# Patient Record
Sex: FEMALE | Race: ASIAN | NOT HISPANIC OR LATINO | URBAN - METROPOLITAN AREA
[De-identification: names, ages, dates, MRNs, and addresses within clinical notes are randomized per-mention and may not be internally consistent; named-entity substitution may affect disease eponyms.]

---

## 2023-11-11 ENCOUNTER — INPATIENT (INPATIENT)
Age: 48
LOS: 3 days | Discharge: HOME | End: 2023-11-15
Attending: STUDENT IN AN ORGANIZED HEALTH CARE EDUCATION/TRAINING PROGRAM | Admitting: STUDENT IN AN ORGANIZED HEALTH CARE EDUCATION/TRAINING PROGRAM
Payer: MEDICAID

## 2023-11-11 VITALS
HEART RATE: 79 BPM | SYSTOLIC BLOOD PRESSURE: 115 MMHG | DIASTOLIC BLOOD PRESSURE: 73 MMHG | RESPIRATION RATE: 18 BRPM | TEMPERATURE: 98 F | OXYGEN SATURATION: 98 % | WEIGHT: 116.07 LBS

## 2023-11-11 DIAGNOSIS — H92.01 OTALGIA, RIGHT EAR: ICD-10-CM

## 2023-11-11 DIAGNOSIS — C50.919 MALIGNANT NEOPLASM OF UNSPECIFIED SITE OF UNSPECIFIED FEMALE BREAST: ICD-10-CM

## 2023-11-11 DIAGNOSIS — E11.9 TYPE 2 DIABETES MELLITUS WITHOUT COMPLICATIONS: ICD-10-CM

## 2023-11-11 DIAGNOSIS — Z29.9 ENCOUNTER FOR PROPHYLACTIC MEASURES, UNSPECIFIED: ICD-10-CM

## 2023-11-11 DIAGNOSIS — Z98.890 OTHER SPECIFIED POSTPROCEDURAL STATES: Chronic | ICD-10-CM

## 2023-11-11 DIAGNOSIS — I82.C11 ACUTE EMBOLISM AND THROMBOSIS OF RIGHT INTERNAL JUGULAR VEIN: ICD-10-CM

## 2023-11-11 DIAGNOSIS — G08 INTRACRANIAL AND INTRASPINAL PHLEBITIS AND THROMBOPHLEBITIS: ICD-10-CM

## 2023-11-11 LAB
ALBUMIN SERPL ELPH-MCNC: 4.3 G/DL — SIGNIFICANT CHANGE UP (ref 3.3–5)
ALBUMIN SERPL ELPH-MCNC: 4.3 G/DL — SIGNIFICANT CHANGE UP (ref 3.3–5)
ALP SERPL-CCNC: 102 U/L — SIGNIFICANT CHANGE UP (ref 40–120)
ALP SERPL-CCNC: 102 U/L — SIGNIFICANT CHANGE UP (ref 40–120)
ALT FLD-CCNC: 19 U/L — SIGNIFICANT CHANGE UP (ref 4–33)
ALT FLD-CCNC: 19 U/L — SIGNIFICANT CHANGE UP (ref 4–33)
ANION GAP SERPL CALC-SCNC: 13 MMOL/L — SIGNIFICANT CHANGE UP (ref 7–14)
ANION GAP SERPL CALC-SCNC: 13 MMOL/L — SIGNIFICANT CHANGE UP (ref 7–14)
APTT BLD: 30.3 SEC — SIGNIFICANT CHANGE UP (ref 24.5–35.6)
APTT BLD: 30.3 SEC — SIGNIFICANT CHANGE UP (ref 24.5–35.6)
AST SERPL-CCNC: 16 U/L — SIGNIFICANT CHANGE UP (ref 4–32)
AST SERPL-CCNC: 16 U/L — SIGNIFICANT CHANGE UP (ref 4–32)
BASE EXCESS BLDV CALC-SCNC: 1.8 MMOL/L — SIGNIFICANT CHANGE UP (ref -2–3)
BASE EXCESS BLDV CALC-SCNC: 1.8 MMOL/L — SIGNIFICANT CHANGE UP (ref -2–3)
BASOPHILS # BLD AUTO: 0.04 K/UL — SIGNIFICANT CHANGE UP (ref 0–0.2)
BASOPHILS # BLD AUTO: 0.04 K/UL — SIGNIFICANT CHANGE UP (ref 0–0.2)
BASOPHILS NFR BLD AUTO: 0.4 % — SIGNIFICANT CHANGE UP (ref 0–2)
BASOPHILS NFR BLD AUTO: 0.4 % — SIGNIFICANT CHANGE UP (ref 0–2)
BILIRUB SERPL-MCNC: <0.2 MG/DL — SIGNIFICANT CHANGE UP (ref 0.2–1.2)
BILIRUB SERPL-MCNC: <0.2 MG/DL — SIGNIFICANT CHANGE UP (ref 0.2–1.2)
BLD GP AB SCN SERPL QL: NEGATIVE — SIGNIFICANT CHANGE UP
BLD GP AB SCN SERPL QL: NEGATIVE — SIGNIFICANT CHANGE UP
BLOOD GAS VENOUS COMPREHENSIVE RESULT: SIGNIFICANT CHANGE UP
BLOOD GAS VENOUS COMPREHENSIVE RESULT: SIGNIFICANT CHANGE UP
BUN SERPL-MCNC: 12 MG/DL — SIGNIFICANT CHANGE UP (ref 7–23)
BUN SERPL-MCNC: 12 MG/DL — SIGNIFICANT CHANGE UP (ref 7–23)
CALCIUM SERPL-MCNC: 9.8 MG/DL — SIGNIFICANT CHANGE UP (ref 8.4–10.5)
CALCIUM SERPL-MCNC: 9.8 MG/DL — SIGNIFICANT CHANGE UP (ref 8.4–10.5)
CHLORIDE BLDV-SCNC: 101 MMOL/L — SIGNIFICANT CHANGE UP (ref 96–108)
CHLORIDE BLDV-SCNC: 101 MMOL/L — SIGNIFICANT CHANGE UP (ref 96–108)
CHLORIDE SERPL-SCNC: 98 MMOL/L — SIGNIFICANT CHANGE UP (ref 98–107)
CHLORIDE SERPL-SCNC: 98 MMOL/L — SIGNIFICANT CHANGE UP (ref 98–107)
CO2 BLDV-SCNC: 28.6 MMOL/L — HIGH (ref 22–26)
CO2 BLDV-SCNC: 28.6 MMOL/L — HIGH (ref 22–26)
CO2 SERPL-SCNC: 25 MMOL/L — SIGNIFICANT CHANGE UP (ref 22–31)
CO2 SERPL-SCNC: 25 MMOL/L — SIGNIFICANT CHANGE UP (ref 22–31)
CREAT SERPL-MCNC: 0.63 MG/DL — SIGNIFICANT CHANGE UP (ref 0.5–1.3)
CREAT SERPL-MCNC: 0.63 MG/DL — SIGNIFICANT CHANGE UP (ref 0.5–1.3)
EGFR: 109 ML/MIN/1.73M2 — SIGNIFICANT CHANGE UP
EGFR: 109 ML/MIN/1.73M2 — SIGNIFICANT CHANGE UP
EOSINOPHIL # BLD AUTO: 0.58 K/UL — HIGH (ref 0–0.5)
EOSINOPHIL # BLD AUTO: 0.58 K/UL — HIGH (ref 0–0.5)
EOSINOPHIL NFR BLD AUTO: 5.9 % — SIGNIFICANT CHANGE UP (ref 0–6)
EOSINOPHIL NFR BLD AUTO: 5.9 % — SIGNIFICANT CHANGE UP (ref 0–6)
GAS PNL BLDV: 136 MMOL/L — SIGNIFICANT CHANGE UP (ref 136–145)
GAS PNL BLDV: 136 MMOL/L — SIGNIFICANT CHANGE UP (ref 136–145)
GLUCOSE BLDV-MCNC: 247 MG/DL — HIGH (ref 70–99)
GLUCOSE BLDV-MCNC: 247 MG/DL — HIGH (ref 70–99)
GLUCOSE SERPL-MCNC: 223 MG/DL — HIGH (ref 70–99)
GLUCOSE SERPL-MCNC: 223 MG/DL — HIGH (ref 70–99)
HCO3 BLDV-SCNC: 27 MMOL/L — SIGNIFICANT CHANGE UP (ref 22–29)
HCO3 BLDV-SCNC: 27 MMOL/L — SIGNIFICANT CHANGE UP (ref 22–29)
HCT VFR BLD CALC: 38.3 % — SIGNIFICANT CHANGE UP (ref 34.5–45)
HCT VFR BLD CALC: 38.3 % — SIGNIFICANT CHANGE UP (ref 34.5–45)
HCT VFR BLDA CALC: 38 % — SIGNIFICANT CHANGE UP (ref 34.5–46.5)
HCT VFR BLDA CALC: 38 % — SIGNIFICANT CHANGE UP (ref 34.5–46.5)
HGB BLD CALC-MCNC: 12.8 G/DL — SIGNIFICANT CHANGE UP (ref 11.7–16.1)
HGB BLD CALC-MCNC: 12.8 G/DL — SIGNIFICANT CHANGE UP (ref 11.7–16.1)
HGB BLD-MCNC: 12.5 G/DL — SIGNIFICANT CHANGE UP (ref 11.5–15.5)
HGB BLD-MCNC: 12.5 G/DL — SIGNIFICANT CHANGE UP (ref 11.5–15.5)
IANC: 5.67 K/UL — SIGNIFICANT CHANGE UP (ref 1.8–7.4)
IANC: 5.67 K/UL — SIGNIFICANT CHANGE UP (ref 1.8–7.4)
IMM GRANULOCYTES NFR BLD AUTO: 0.3 % — SIGNIFICANT CHANGE UP (ref 0–0.9)
IMM GRANULOCYTES NFR BLD AUTO: 0.3 % — SIGNIFICANT CHANGE UP (ref 0–0.9)
INR BLD: 0.95 RATIO — SIGNIFICANT CHANGE UP (ref 0.85–1.18)
INR BLD: 0.95 RATIO — SIGNIFICANT CHANGE UP (ref 0.85–1.18)
LACTATE BLDV-MCNC: 2.5 MMOL/L — HIGH (ref 0.5–2)
LACTATE BLDV-MCNC: 2.5 MMOL/L — HIGH (ref 0.5–2)
LYMPHOCYTES # BLD AUTO: 2.93 K/UL — SIGNIFICANT CHANGE UP (ref 1–3.3)
LYMPHOCYTES # BLD AUTO: 2.93 K/UL — SIGNIFICANT CHANGE UP (ref 1–3.3)
LYMPHOCYTES # BLD AUTO: 30 % — SIGNIFICANT CHANGE UP (ref 13–44)
LYMPHOCYTES # BLD AUTO: 30 % — SIGNIFICANT CHANGE UP (ref 13–44)
MCHC RBC-ENTMCNC: 23.6 PG — LOW (ref 27–34)
MCHC RBC-ENTMCNC: 23.6 PG — LOW (ref 27–34)
MCHC RBC-ENTMCNC: 32.6 GM/DL — SIGNIFICANT CHANGE UP (ref 32–36)
MCHC RBC-ENTMCNC: 32.6 GM/DL — SIGNIFICANT CHANGE UP (ref 32–36)
MCV RBC AUTO: 72.3 FL — LOW (ref 80–100)
MCV RBC AUTO: 72.3 FL — LOW (ref 80–100)
MONOCYTES # BLD AUTO: 0.51 K/UL — SIGNIFICANT CHANGE UP (ref 0–0.9)
MONOCYTES # BLD AUTO: 0.51 K/UL — SIGNIFICANT CHANGE UP (ref 0–0.9)
MONOCYTES NFR BLD AUTO: 5.2 % — SIGNIFICANT CHANGE UP (ref 2–14)
MONOCYTES NFR BLD AUTO: 5.2 % — SIGNIFICANT CHANGE UP (ref 2–14)
NEUTROPHILS # BLD AUTO: 5.67 K/UL — SIGNIFICANT CHANGE UP (ref 1.8–7.4)
NEUTROPHILS # BLD AUTO: 5.67 K/UL — SIGNIFICANT CHANGE UP (ref 1.8–7.4)
NEUTROPHILS NFR BLD AUTO: 58.2 % — SIGNIFICANT CHANGE UP (ref 43–77)
NEUTROPHILS NFR BLD AUTO: 58.2 % — SIGNIFICANT CHANGE UP (ref 43–77)
NRBC # BLD: 0 /100 WBCS — SIGNIFICANT CHANGE UP (ref 0–0)
NRBC # BLD: 0 /100 WBCS — SIGNIFICANT CHANGE UP (ref 0–0)
NRBC # FLD: 0 K/UL — SIGNIFICANT CHANGE UP (ref 0–0)
NRBC # FLD: 0 K/UL — SIGNIFICANT CHANGE UP (ref 0–0)
PCO2 BLDV: 45 MMHG — SIGNIFICANT CHANGE UP (ref 39–52)
PCO2 BLDV: 45 MMHG — SIGNIFICANT CHANGE UP (ref 39–52)
PH BLDV: 7.39 — SIGNIFICANT CHANGE UP (ref 7.32–7.43)
PH BLDV: 7.39 — SIGNIFICANT CHANGE UP (ref 7.32–7.43)
PLATELET # BLD AUTO: 202 K/UL — SIGNIFICANT CHANGE UP (ref 150–400)
PLATELET # BLD AUTO: 202 K/UL — SIGNIFICANT CHANGE UP (ref 150–400)
PO2 BLDV: 38 MMHG — SIGNIFICANT CHANGE UP (ref 25–45)
PO2 BLDV: 38 MMHG — SIGNIFICANT CHANGE UP (ref 25–45)
POTASSIUM BLDV-SCNC: 4.6 MMOL/L — SIGNIFICANT CHANGE UP (ref 3.5–5.1)
POTASSIUM BLDV-SCNC: 4.6 MMOL/L — SIGNIFICANT CHANGE UP (ref 3.5–5.1)
POTASSIUM SERPL-MCNC: 4.3 MMOL/L — SIGNIFICANT CHANGE UP (ref 3.5–5.3)
POTASSIUM SERPL-MCNC: 4.3 MMOL/L — SIGNIFICANT CHANGE UP (ref 3.5–5.3)
POTASSIUM SERPL-SCNC: 4.3 MMOL/L — SIGNIFICANT CHANGE UP (ref 3.5–5.3)
POTASSIUM SERPL-SCNC: 4.3 MMOL/L — SIGNIFICANT CHANGE UP (ref 3.5–5.3)
PROT SERPL-MCNC: 7.4 G/DL — SIGNIFICANT CHANGE UP (ref 6–8.3)
PROT SERPL-MCNC: 7.4 G/DL — SIGNIFICANT CHANGE UP (ref 6–8.3)
PROTHROM AB SERPL-ACNC: 10.8 SEC — SIGNIFICANT CHANGE UP (ref 9.5–13)
PROTHROM AB SERPL-ACNC: 10.8 SEC — SIGNIFICANT CHANGE UP (ref 9.5–13)
RBC # BLD: 5.3 M/UL — HIGH (ref 3.8–5.2)
RBC # BLD: 5.3 M/UL — HIGH (ref 3.8–5.2)
RBC # FLD: 15 % — HIGH (ref 10.3–14.5)
RBC # FLD: 15 % — HIGH (ref 10.3–14.5)
RH IG SCN BLD-IMP: POSITIVE — SIGNIFICANT CHANGE UP
RH IG SCN BLD-IMP: POSITIVE — SIGNIFICANT CHANGE UP
SAO2 % BLDV: 46 % — LOW (ref 67–88)
SAO2 % BLDV: 46 % — LOW (ref 67–88)
SODIUM SERPL-SCNC: 136 MMOL/L — SIGNIFICANT CHANGE UP (ref 135–145)
SODIUM SERPL-SCNC: 136 MMOL/L — SIGNIFICANT CHANGE UP (ref 135–145)
WBC # BLD: 9.76 K/UL — SIGNIFICANT CHANGE UP (ref 3.8–10.5)
WBC # BLD: 9.76 K/UL — SIGNIFICANT CHANGE UP (ref 3.8–10.5)
WBC # FLD AUTO: 9.76 K/UL — SIGNIFICANT CHANGE UP (ref 3.8–10.5)
WBC # FLD AUTO: 9.76 K/UL — SIGNIFICANT CHANGE UP (ref 3.8–10.5)

## 2023-11-11 PROCEDURE — 99285 EMERGENCY DEPT VISIT HI MDM: CPT

## 2023-11-11 PROCEDURE — 70481 CT ORBIT/EAR/FOSSA W/DYE: CPT | Mod: 26,MG

## 2023-11-11 PROCEDURE — 70496 CT ANGIOGRAPHY HEAD: CPT | Mod: 26,MG

## 2023-11-11 PROCEDURE — G1004: CPT

## 2023-11-11 PROCEDURE — 99223 1ST HOSP IP/OBS HIGH 75: CPT | Mod: GC

## 2023-11-11 RX ORDER — HEPARIN SODIUM 5000 [USP'U]/ML
4000 INJECTION INTRAVENOUS; SUBCUTANEOUS EVERY 6 HOURS
Refills: 0 | Status: DISCONTINUED | OUTPATIENT
Start: 2023-11-11 | End: 2023-11-15

## 2023-11-11 RX ORDER — SODIUM CHLORIDE 9 MG/ML
1000 INJECTION INTRAMUSCULAR; INTRAVENOUS; SUBCUTANEOUS ONCE
Refills: 0 | Status: COMPLETED | OUTPATIENT
Start: 2023-11-11 | End: 2023-11-11

## 2023-11-11 RX ORDER — HEPARIN SODIUM 5000 [USP'U]/ML
4000 INJECTION INTRAVENOUS; SUBCUTANEOUS ONCE
Refills: 0 | Status: COMPLETED | OUTPATIENT
Start: 2023-11-11 | End: 2023-11-11

## 2023-11-11 RX ORDER — KETOROLAC TROMETHAMINE 30 MG/ML
30 SYRINGE (ML) INJECTION ONCE
Refills: 0 | Status: DISCONTINUED | OUTPATIENT
Start: 2023-11-11 | End: 2023-11-11

## 2023-11-11 RX ORDER — HEPARIN SODIUM 5000 [USP'U]/ML
2000 INJECTION INTRAVENOUS; SUBCUTANEOUS EVERY 6 HOURS
Refills: 0 | Status: DISCONTINUED | OUTPATIENT
Start: 2023-11-11 | End: 2023-11-15

## 2023-11-11 RX ORDER — HEPARIN SODIUM 5000 [USP'U]/ML
INJECTION INTRAVENOUS; SUBCUTANEOUS
Qty: 25000 | Refills: 0 | Status: DISCONTINUED | OUTPATIENT
Start: 2023-11-11 | End: 2023-11-15

## 2023-11-11 RX ORDER — METOCLOPRAMIDE HCL 10 MG
10 TABLET ORAL ONCE
Refills: 0 | Status: COMPLETED | OUTPATIENT
Start: 2023-11-11 | End: 2023-11-11

## 2023-11-11 RX ADMIN — HEPARIN SODIUM 4000 UNIT(S): 5000 INJECTION INTRAVENOUS; SUBCUTANEOUS at 19:45

## 2023-11-11 RX ADMIN — HEPARIN SODIUM 1000 UNIT(S)/HR: 5000 INJECTION INTRAVENOUS; SUBCUTANEOUS at 19:45

## 2023-11-11 RX ADMIN — SODIUM CHLORIDE 1000 MILLILITER(S): 9 INJECTION INTRAMUSCULAR; INTRAVENOUS; SUBCUTANEOUS at 16:24

## 2023-11-11 RX ADMIN — Medication 30 MILLIGRAM(S): at 16:17

## 2023-11-11 RX ADMIN — Medication 10 MILLIGRAM(S): at 16:24

## 2023-11-11 NOTE — ED ADULT NURSE REASSESSMENT NOTE - NS ED NURSE REASSESS COMMENT FT1
Patient brought to Results Waiting area from [facility]. Patient seen by Neuro and had labs drawn and sent as ordered. Pt has a 20 gauge IV lock to left antecubital. report given to Intake KATIA Lott. Darnell transferred over for Heparin administration.   KATIA Zhou

## 2023-11-11 NOTE — H&P ADULT - PROBLEM SELECTOR PLAN 4
DVT ppx: heparin gtt  Diet: Consistent carb  Dispo: pending course and clinical improvement  Code status: full code Pt with history of Type 2 DM on home metformin 500 mg BID    Plan:  Hold home metformin while inpatient  Obtain HbA1c with AM labs  Monitor FS AC and QHS  Low dose ISS Pt with history of R breast cancer dx in 2017 (s/p lumpectomy and radiation), follows at [facility]. Reports has been in remission on mammogram outpatient in June 2023.    Plan:  Hold anastrazole 1 mg while inpatient  CT chest to evaluate for recurrence/progression of malignancy  CTA to evaluate for PE  continue heme/onc f/u Pt with history of R breast cancer dx in 2017 (s/p lumpectomy and radiation completed in 2023, currently on anastrozole 1 mg daily), follows at [facility]. Reports has been in remission on mammogram outpatient in June 2023.    Plan:  Hold anastrozole 1 mg while inpatient  CT chest to evaluate for recurrence/progression of malignancy  CTA to evaluate for PE  continue heme/onc f/u Pt with history of R breast cancer dx in 2017 (s/p lumpectomy and radiation completed in 2023, currently on anastrozole 1 mg daily), follows at [facility]. Reports has been in remission on mammogram outpatient in June 2023.    Plan:  Hold anastrozole 1 mg while inpatient  CTA to evaluate for recurrence/progression of malignancy and to evaluate for PE  continue heme/onc f/u Pt with history of R breast cancer dx in 2017 (s/p lumpectomy and radiation completed in 2023, currently on anastrozole 1 mg daily), follows at [facility]. Reports has been in remission on mammogram outpatient in June 2023.    Plan:  Hold anastrozole 1 mg while inpatient  CTA to evaluate for recurrence/progression of malignancy and to evaluate for PE  Consider heme onc consult Pt with history of R breast cancer dx in 2017 (s/p lumpectomy and radiation completed in 2023, currently on anastrozole 1 mg daily), follows at [facility]. Reports has been in remission since mammogram outpatient in June 2023.    Plan:  Hold anastrozole 1 mg daily for now  CTA chest with IV contrast to evaluate for recurrence/progression of malignancy and to evaluate for PE  Reach out to pt's oncology team at [place] to see if pt can safely c/w anastrozole given thrombus

## 2023-11-11 NOTE — ED PEDIATRIC TRIAGE NOTE - CHIEF COMPLAINT QUOTE
Patient with right ear pain for 3 weeks worsening progressively now extended to the neck and the forehead. Denies trauma, denies discharge. Denies fever. Seen by doctor and started on ear drops 5 ays ago but not working

## 2023-11-11 NOTE — CONSULT NOTE ADULT - ASSESSMENT
ASSESSMENT       IMPRESSION     RECOMMENDATION   [] heparin drip  [] f/u CTV  [] MRI brain w/o contrast, MRV (if CTV not performed)  [] ophthalmology consult to evaluate for papilledema  [] hypercoag workup  [] q4 neurochecks  [] if worsening neurology status, please update neurology team as further intervention (e.g. endovascular) may be indicated, hold off for now    Case discussed with stroke fellow Dr. Leonard Sandoval under superivison of Dr. Arrieta  Patient to be seen by team and attending in AM. Note finalized upon attending attestation.  ASSESSMENT   48-year-old female past medical history of diabetes and breast cancer (diagnosed in 2017 reports completed radiotherapy in 2023), presenting initially to the emergency room complaining of right ear pain >3 weeks. CT venogram showing occlusion of the RIGHT internal jugular bulb with intraluminal thrombus extending into the proximal cervical RIGHT internal jugular vein.  Occlusion of the RIGHT sigmoid sinus and nonocclusive thrombus extending into the distal RIGHT transverse sinus.  LKW: >3 weeks  MRS: 0  NIHSS: 0    IMPRESSION   More than 3 weeks durations with CTV imaging showing right internal IJ with intraluminal thrombus extending into the proximal cervical right internal jugular vein, occlusion of right sigmoid sinus and nonocclusive thrombus extending into the distal right transverse sinus, in setting of breast cancer, may be hypercoag etiology, pending further workup    RECOMMENDATION   [] heparin drip  [] f/u CTV  [] MRI brain w/o contrast, MRV (if CTV not performed)  [] ophthalmology consult to evaluate for papilledema  [] hypercoag workup  [] q4 neurochecks  [] given no focal neurological deficits likely minimal indication for endovascular intervention; if worsening neurology status, please update neurology team and touch base with neurosurgery team    Case discussed with stroke fellow Dr. Leonard Sandoval under supervision of Dr. Arrieta  Patient to be seen by team and attending in AM. Note finalized upon attending attestation.  ASSESSMENT   48-year-old female past medical history of diabetes and breast cancer (diagnosed in 2017 reports completed radiotherapy in 2023), presenting initially to the emergency room complaining of right ear pain >3 weeks. CT venogram showing occlusion of the RIGHT internal jugular bulb with intraluminal thrombus extending into the proximal cervical RIGHT internal jugular vein.  Occlusion of the RIGHT sigmoid sinus and nonocclusive thrombus extending into the distal RIGHT transverse sinus.  LKW: >3 weeks  MRS: 0  NIHSS: 0    IMPRESSION   More than 3 weeks durations with CTV imaging showing right internal IJ with intraluminal thrombus extending into the proximal cervical right internal jugular vein, occlusion of right sigmoid sinus and nonocclusive thrombus extending into the distal right transverse sinus, in setting of breast cancer, may be hypercoag etiology, pending further workup    RECOMMENDATION   [] heparin drip  [] MRI brain w/o contrast  [] ophthalmology consult to evaluate for papilledema  [] hypercoag workup  [] q4 neurochecks  [] given no focal neurological deficits likely minimal indication for endovascular intervention; if worsening neurology status, please update neurology team and touch base with neurosurgery team    Case discussed with stroke fellow Dr. Leonard Sandoval under supervision of Dr. Arrieta  Patient to be seen by team and attending in AM. Note finalized upon attending attestation.  ASSESSMENT   48-year-old female past medical history of diabetes and breast cancer (diagnosed in 2017 reports completed radiotherapy in 2023), presenting initially to the emergency room complaining of right ear pain >3 weeks. CT venogram showing occlusion of the RIGHT internal jugular bulb with intraluminal thrombus extending into the proximal cervical RIGHT internal jugular vein.  Occlusion of the RIGHT sigmoid sinus and nonocclusive thrombus extending into the distal RIGHT transverse sinus.  LKW: >3 weeks  MRS: 0  NIHSS: 0    IMPRESSION   More than 3 weeks durations with CTV imaging showing right internal IJ with intraluminal thrombus extending into the proximal cervical right internal jugular vein, occlusion of right sigmoid sinus and nonocclusive thrombus extending into the distal right transverse sinus, in setting of breast cancer, may be hypercoag etiology, pending further workup    RECOMMENDATION   [] heparin drip  [] MRI brain w/o contrast  [] ophthalmology consult to evaluate for papilledema  [] hypercoag workup  [] q4 neurochecks  [] given no focal neurological deficits likely minimal indication for endovascular intervention; if worsening neurology status, please update neurology team and touch base with neurosurgery team    Paged primary team overnight recommending more urgent ophthalmology consult.  Case discussed with stroke fellow Dr. Leonard Sandoval under supervision of Dr. Arrieta  Patient to be seen by team and attending in AM. Note finalized upon attending attestation.

## 2023-11-11 NOTE — ED PROVIDER NOTE - PHYSICAL EXAMINATION
Gen: Well appearing in NAD  Head: NC/AT  Neck: trachea midline  Resp:  No distress  Ext: no deformities  Neuro:  A&O appears non focal  Skin:  Warm and dry as visualized  Psych:  Normal affect and mood     right ear: no swelling no obv def. no auricular or tragal ttp. + ttp msastoid area. no swelling or induration  mild eac swelling and erythema. tm visualized without erythema or purulence.

## 2023-11-11 NOTE — ED ADULT NURSE NOTE - OBJECTIVE STATEMENT
Pt received c/o R ear pain X 3 weeks, seen at urgent care and given ear drops with no relief. Reports pain radiating to head and down neck. Hx. of breast cancer not on chemo and DM.  Denies Chest pain/SOB. Respirations even and unlabored. 20g IV placed in L AC, labs drawn as ordered. SAfety maintained. Pt received c/o R ear pain X 3 weeks, seen at urgent care and given ear drops with no relief. Reports pain radiating to head and down neck. Hx. of breast cancer not on chemo and DM.  Denies Chest pain/SOB. Respirations even and unlabored. 20g IV placed in L AC, labs drawn as ordered. Safety maintained.

## 2023-11-11 NOTE — H&P ADULT - ASSESSMENT
48-year-old female past medical history of Type 2 DM presents with right sided ear pain x2 weeks. R pinna is very tender to palpation, with associated pain radiating up to the temple and down to her right jaw, no fevers, chills, no ear discharge, bleeding, or auditory changes. CT venogram revealing occlusion of the R internal jugular bulb with intraluminal thrombus extending into the proximal cervical R IJ vein. Occlusion of the R sigmoid sinus and nonocclusive thrombus extending into the distal RIGHT transverse sinus. Admitted to medicine for further management of thrombus 48-year-old female past medical history of Type 2 DM presents with headache and right sided ear pain for the past 3 weeks. R pinna is very tender to palpation, with associated pain radiating up to the temple and down to her right jaw, no fevers, chills, no ear discharge, bleeding, or auditory changes. CT venogram revealing occlusion of the R internal jugular bulb with intraluminal thrombus extending into the proximal cervical R IJ vein, occlusion of the R sigmoid sinus and nonocclusive thrombus extending into the distal RIGHT transverse sinus. Admitted to medicine for further management of thrombus 48-year-old female past medical history of Type 2 DM presents with headache and right sided ear pain for the past 3 weeks. R pinna is very tender to palpation, with associated pain radiating up to the temple and down to her right jaw, no fevers, chills, no ear discharge, bleeding, or auditory changes. CT venogram revealing occlusion of the R internal jugular bulb with intraluminal thrombus extending into the proximal cervical R IJ vein, occlusion of the R sigmoid sinus and nonocclusive thrombus extending into the distal R transverse sinus. Admitted to medicine for further management of thrombus 48-year-old female past medical history of Breast cancer (s/p lumpectomy, radiation tx, and on anastrozole),Type 2 DM presents with headache and right sided ear pain for the past 3 weeks. R pinna is tender to palpation, with associated pain radiating up to the temple and down to her right jaw, no fevers, chills, no ear discharge, bleeding, auditory or neurological changes. CT venogram revealing occlusion of the R internal jugular bulb with intraluminal thrombus extending into the proximal cervical R IJ vein, occlusion of the R sigmoid sinus and nonocclusive thrombus extending into the distal R transverse sinus. Admitted to medicine for further management of thrombus 48-year-old female with a history of R breast cancer (dx 2017 s/p lumpectomy, radiation tx in 2023, in remission on anastrozole),Type 2 DM presents with headache and right sided ear pain for the past 3 weeks. R pinna is tender to palpation, with associated pain radiating up to the temple and down to her right jaw, no fevers, chills, no ear discharge, bleeding, auditory or neurological changes. CT venogram revealing occlusion of the R internal jugular bulb with intraluminal thrombus extending into the proximal cervical R IJ vein, occlusion of the R sigmoid sinus and nonocclusive thrombus extending into the distal R transverse sinus. Admitted to medicine for further management of thrombus

## 2023-11-11 NOTE — H&P ADULT - PROBLEM SELECTOR PLAN 2
CT venogram and IAC showing R internal jugular bulb with intraluminal thrombus extending into the proximal cervical R internal jugular vein. Occlusion of the R sigmoid sinus and nonocclusive thrombus extending into the distal RIGHT transverse sinus. No hx of trauma to the neck, no catheterization, __ on OCPs.    Plan:  Continue on heparin gtt  Hypercoaguability workup: Factor V leiden  f/u brain MRI to rule out venous infarct  Neurosurgery consulted regarding possible thrombectomy Pt with history of breast cancer in 2017 s/p lumpectomy and RT, on Anastrazole 1 mg daily. CT venogram and IAC showing R internal jugular bulb with intraluminal thrombus extending into the proximal cervical R internal jugular vein. Occlusion of the R sigmoid sinus and nonocclusive thrombus extending into the distal RIGHT transverse sinus.  No hx of trauma to the neck, no catheterization of extremities, non smoker, no recent travel. Coag studies wnl. Pt is hypercoagulable due to hx of malignancy and current aromatase inhibitor use.    Plan:  Continue on heparin gtt  Hypercoaguability workup: Inherited thrombophilias (protein S, C, antithrombin deficiency, FVL, prothrombin gene mutation)  US of b/l lower extremities for LE DVT evaluation  f/u brain MRI to rule out venous infarct  Monitor neuro checks q4  Monitor on tele  f/u screening EKG  Neurology recs appreciated, as no neuro deficits likely no role for neurosurgery but will consider neurosurgery for thrombectomy if any changes to neuro exam Pt with history of breast cancer in 2017 s/p lumpectomy and RT, on Anastrazole 1 mg daily. CT venogram and IAC showing R internal jugular bulb with intraluminal thrombus extending into the proximal cervical R internal jugular vein. Occlusion of the R sigmoid sinus and nonocclusive thrombus extending into the distal RIGHT transverse sinus.  No hx of trauma to the neck, no catheterization of extremities, non smoker, no recent travel. Coag studies wnl. Pt is hypercoagulable due to hx of malignancy and current aromatase inhibitor use.    Plan:  Continue on heparin gtt  Hypercoaguability workup: Inherited thrombophilias (protein S, C, antithrombin deficiency, FVL, prothrombin gene mutation)  US of b/l lower extremities for LE DVT evaluation  f/u brain MRI to rule out venous infarct  Monitor neuro checks q4  Monitor on tele  f/u screening EKG  Neurology recs appreciated, as no neuro deficits likely no role for neurosurgery but will consider neurosurgery for thrombectomy if any changes to neuro exam  Ophthalmology consult in AM to evaluate for papilledema Pt with history of breast cancer in 2017 s/p lumpectomy and RT, on Anastrazole 1 mg daily. CT venogram and IAC showing R internal jugular bulb with intraluminal thrombus extending into the proximal cervical R internal jugular vein. Occlusion of the R sigmoid sinus and nonocclusive thrombus extending into the distal RIGHT transverse sinus.  No hx of trauma to the neck, no catheterization of extremities, non smoker, no recent travel. Coag studies wnl. Pt is hypercoagulable due to hx of malignancy and current aromatase inhibitor use.    Plan:  Continue on heparin gtt  Hypercoaguability workup: Inherited thrombophilias (protein S, C, antithrombin deficiency, FVL, prothrombin gene mutation)  US of b/l lower extremities for LE DVT evaluation  f/u brain MRI to rule out venous infarct  Monitor neuro checks q4  Monitor on tele  Neurology recs appreciated, as no neuro deficits likely no role for neurosurgery but placed neurosurgery consult, f/u recs for thrombectomy if any changes to neuro exam  Ophthalmology consult in AM to evaluate for papilledema Pt with history of breast cancer in 2017 s/p lumpectomy and RT, on Anastrazole 1 mg daily. CT venogram and IAC showing R internal jugular bulb with intraluminal thrombus extending into the proximal cervical R internal jugular vein. Occlusion of the R sigmoid sinus and nonocclusive thrombus extending into the distal RIGHT transverse sinus.  No hx of trauma to the neck, no catheterization of extremities, non smoker, no recent travel. Coag studies wnl. Pt is hypercoagulable due to hx of malignancy and current aromatase inhibitor use.    Plan:  Continue on heparin gtt  Hypercoaguability workup: Inherited thrombophilias (protein S, C, functional assay of activated protein C for factor V Leiden, antithrombin deficiency, beta 2 glycoprotein, anticardiolipin, Lupus anticoagulant, antiphospholipid Ab)  US of b/l lower extremities for LE DVT evaluation  f/u brain MRI to rule out venous infarct  Monitor neuro checks q4  Monitor on tele  Neurology recs appreciated, as no neuro deficits likely no role for neurosurgery but placed neurosurgery consult, f/u recs for thrombectomy if any changes to neuro exam  Ophthalmology consult in AM to evaluate for papilledema Pt with history of breast cancer in 2017 s/p lumpectomy and RT, on Anastrazole 1 mg daily. CT venogram and IAC showing R internal jugular bulb with intraluminal thrombus extending into the proximal cervical R internal jugular vein. Occlusion of the R sigmoid sinus and nonocclusive thrombus extending into the distal RIGHT transverse sinus.  No hx of trauma to the neck, no catheterization of extremities, non smoker, no recent travel. Coag studies wnl. Pt is hypercoagulable and likely provoked due to hx of malignancy and current aromatase inhibitor use, but will do hypercoagulability workup.    Plan:  Continue on heparin gtt  f/u Hypercoaguability workup: Inherited thrombophilias (protein S, C, functional assay of activated protein C for factor V Leiden, antithrombin deficiency, beta 2 glycoprotein, anticardiolipin, Lupus anticoagulant)  US of b/l lower extremities for LE DVT evaluation  f/u brain MRI to rule out venous infarct  Monitor neuro checks q4  Monitor on tele  Neurology recs appreciated, as no neuro deficits likely no role for neurosurgery but placed neurosurgery consult, f/u recs for thrombectomy if any changes to neuro exam  Ophthalmology consult in AM to evaluate for papilledema Pt with history of breast cancer in 2017 s/p lumpectomy and RT, on anastrazole 1 mg daily. CT venogram and IAC showing R internal jugular bulb with intraluminal thrombus extending into the proximal cervical R internal jugular vein. Occlusion of the R sigmoid sinus and nonocclusive thrombus extending into the distal RIGHT transverse sinus. No hx of trauma to the neck, no catheterization of extremities, non smoker, no recent travel. Coag studies wnl. Pt is hypercoagulable and likely provoked due to hx of malignancy and current aromatase inhibitor use, but will do hypercoagulability workup.    Plan:  Continue on heparin gtt  F/u hypercoagulability workup: Inherited thrombophilias (protein S, C, functional assay of activated protein C for factor V Leiden, antithrombin deficiency, beta 2 glycoprotein, anticardiolipin, Lupus anticoagulant)  Venous dopplers of b/l lower extremities for LE DVT evaluation  F/u MRI brain to rule out venous infarct  Monitor neuro checks q4hrs  Monitor on tele  Neurology recs appreciated, as no neuro deficits likely no role for neurosurgery but placed neurosurgery consult, f/u recs for thrombectomy if any changes to neuro exam  Ophthalmology consult to be called in AM to evaluate for R papilledema

## 2023-11-11 NOTE — CONSULT NOTE ADULT - SUBJECTIVE AND OBJECTIVE BOX
Neurology - Consult Note    Spectra: [phone] ([facility]), [phone] ([facility])    HPI:  48-year-old female past medical history of Type 2 DM presents with right ear pain x2 weeks.  Patient states that for the past 2 weeks has had right-sided ear pain, the ear is painful posteriorly and is very tender to palpation, radiating up to the temple and down to her right jaw. She went to urgent care last week and was prescribed eardrops with minimal relief.  Patient denies ear discharge or bleeding, auditory changes, fevers, chills, nausea, vomiting,    ED vitals: T 98.3 HR 98 /98 RR 18 Sa 100% on RA  CT internal auditory canal w/ IV contrast and CT venogram brain w/ IV contrast:   In ED, received 1L NS, toradol, reglan. Started on heparin gtt (11 Nov 2023 21:55)      Review of Systems:  INCOMPLETE   CONSTITUTIONAL: No fevers or chills  EYES AND ENT: No visual changes or no throat pain   NECK: No pain or stiffness  RESPIRATORY: No shortness of breath  CARDIOVASCULAR: No chest pain or palpitations  GASTROINTESTINAL: No nausea or vomiting   GENITOURINARY: No dysuria  NEUROLOGICAL: +As stated in HPI above  SKIN: No itching, burning, rashes, or lesions   All other review of systems is negative unless indicated above.    Allergies:  No Known Allergies      PMHx/PSHx/Family Hx: As above, otherwise see below   DM (diabetes mellitus)        Social Hx:  Never smoker; no current use of tobacco, alcohol, or illicit drugs  Lives with ***  Occupation ***  Baseline functional status is ***    Medications:  MEDICATIONS  (STANDING):  heparin  Infusion.  Unit(s)/Hr (10 mL/Hr) IV Continuous <Continuous>    MEDICATIONS  (PRN):  heparin   Injectable 2000 Unit(s) IV Push every 6 hours PRN For aPTT between 40 - 57  heparin   Injectable 4000 Unit(s) IV Push every 6 hours PRN For aPTT less than 40      Vitals:  T(C): 36.6 (11-11-23 @ 17:37), Max: 36.8 (11-11-23 @ 14:45)  HR: 68 (11-11-23 @ 21:50) (67 - 98)  BP: 118/74 (11-11-23 @ 21:50) (115/73 - 158/98)  RR: 18 (11-11-23 @ 21:50) (18 - 18)  SpO2: 99% (11-11-23 @ 21:50) (98% - 100%)    Physical Examination: INCOMPLETE  General - non-toxic appearing male/female in no acute distress  Cardiovascular - peripheral pulses palpable, no edema  Respiratory - breathing comfortably with no increased work of breathing    Neurologic Exam:  Mental status - Awake, Alert, Oriented to person, place, and time. Speech fluent, repetition and naming intact. Follows simple and complex commands. Attention/concentration, recent and remote memory (including registration 3/3 and recall 3/3), and fund of knowledge intact    Cranial nerves - PERRLA (4mm -> 3mm b/l), VFF, EOMI, face sensation (V1-V3) intact b/l, facial strength intact without asymmetry b/l, hearing intact b/l, palate with symmetric elevation, trapezius OR sternocleidomastiod 5/5 strength b/l, tongue midline on protrusion with full lateral movement    Motor - Normal bulk and tone throughout. No pronator drift.  Strength testing            Deltoid      Biceps      Triceps     Wrist Extension    Wrist Flexion     Interossei         R            5                 5               5                     5                              5                        5                 5  L             5                 5               5                     5                              5                        5                 5              Hip Flexion    Hip Extension    Knee Flexion    Knee Extension    Dorsiflexion    Plantar Flexion  R              5                         5                       5                           5                            5                          5  L              5                         5                        5                           5                            5                          5    Sensation - Light touch/temperature OR pain/vibration intact throughout  DTR's -             Biceps      Triceps     Brachioradialis      Patellar    Ankle    Toes/plantar response  R             2+             2+                  2+                       2+            2+                 Down  L              2+             2+                 2+                        2+           2+                 Down    Coordination - Finger to Nose intact b/l. No tremors appreciated    Gait and station - Normal casual gait. Romberg (-)    Labs:                        12.5   9.76  )-----------( 202      ( 11 Nov 2023 15:32 )             38.3     11-11    136  |  98  |  12  ----------------------------<  223<H>  4.3   |  25  |  0.63    Ca    9.8      11 Nov 2023 15:32    TPro  7.4  /  Alb  4.3  /  TBili  <0.2  /  DBili  x   /  AST  16  /  ALT  19  /  AlkPhos  102  11-11    CAPILLARY BLOOD GLUCOSE      POCT Blood Glucose.: 274 mg/dL (11 Nov 2023 14:48)    LIVER FUNCTIONS - ( 11 Nov 2023 15:32 )  Alb: 4.3 g/dL / Pro: 7.4 g/dL / ALK PHOS: 102 U/L / ALT: 19 U/L / AST: 16 U/L / GGT: x             PT/INR - ( 11 Nov 2023 18:50 )   PT: 10.8 sec;   INR: 0.95 ratio         PTT - ( 11 Nov 2023 18:50 )  PTT:30.3 sec  CSF:                  Radiology:     Neurology - Consult Note    Spectra: [phone] ([facility]), [phone] ([facility])    HPI: 48-year-old female past medical history of diabetes and breast cancer (diagnosed in 2017 reports completed radiotherapy in 2023), presenting initially to the emergency room complaining of right ear pain >3 weeks.     Patient reports for the past two weeks, had been having right sided ear pain radiating up to the temple and down to the right jaw. Was prescribed eardrops at urgent care with minimal relief. States the area behind her ear and right part of her forehead are tender to palpation.  Reports the pain intensity worsens when she turns in bed, when walking, and when when going from a sitting to a standing position. States before 2 days prior to presentation, the headache had been fluctuating however it has been persistent in the last two days prior to presentation. Also reports pain in R eye.    No prior similar headaches. No prior history of strokes. No prior history of clots that patient is aware of. No recent infections, no fevers/chills. States baseline is nearsighted, denies any acute changes in the last 3 weeks. Denies double vision, denies blurry vision, denies slurred speech.  LKW: >3 weeks  MRS: 0  NIHSS: 0    SH: No substance use.   FH: No family history of malignancies.     Review of Systems:    NEUROLOGICAL: +As stated in HPI above  All other review of systems is negative unless indicated above.    Allergies:  No Known Allergies    PMHx/PSHx/Family Hx: As above, otherwise see below   DM (diabetes mellitus)    Social Hx:  as above    Medications:  MEDICATIONS  (STANDING):  heparin  Infusion.  Unit(s)/Hr (10 mL/Hr) IV Continuous <Continuous>    MEDICATIONS  (PRN):  heparin   Injectable 2000 Unit(s) IV Push every 6 hours PRN For aPTT between 40 - 57  heparin   Injectable 4000 Unit(s) IV Push every 6 hours PRN For aPTT less than 40      Vitals:  T(C): 36.6 (11-11-23 @ 17:37), Max: 36.8 (11-11-23 @ 14:45)  HR: 68 (11-11-23 @ 21:50) (67 - 98)  BP: 118/74 (11-11-23 @ 21:50) (115/73 - 158/98)  RR: 18 (11-11-23 @ 21:50) (18 - 18)  SpO2: 99% (11-11-23 @ 21:50) (98% - 100%)    Physical Examination:   General - non-toxic appearing female in no acute distress    Neurologic Exam:  Mental status - Awake, Alert, Oriented to person, place, and time. Speech fluent, repetition and naming intact (pen, watch). Follows simple and complex commands (L hand right ear).     Cranial nerves - PERRLA (3mm -> 2mm b/l), VFF, EOMI, face sensation (V1-V3) intact b/l, facial strength intact without asymmetry b/l, hearing intact b/l, palate with symmetric elevation, sternocleidomastiod 5/5 strength b/l, tongue midline on protrusion with full lateral movement  Motor - Normal bulk and tone throughout. No pronator drift.  Strength testing            Deltoid      Biceps      Triceps     Wrist Extension    Wrist Flexion        R            5                 5               5                     5                              5                    5  L             5                 5               5                     5                              5                  5              Hip Flexion     Knee Flexion    Knee Extension    Dorsiflexion    Plantar Flexion  R              5                                5                           5                            5                          5  L              5                               5                           5                            5                          5    Sensation - Light touch intact throughout  DTR's -             Biceps      Triceps     Brachioradialis      Patellar    Ankle    Toes/plantar response  R             2+             2+                  2+                       3+            2+                neutral  L              2+             2+                 2+                        3+           2+                 neutral  Coordination - Finger to Nose intact b/l. No tremors appreciated  Gait and station - Normal casual gait.    Labs:                        12.5   9.76  )-----------( 202      ( 11 Nov 2023 15:32 )             38.3     11-11    136  |  98  |  12  ----------------------------<  223<H>  4.3   |  25  |  0.63    Ca    9.8      11 Nov 2023 15:32    TPro  7.4  /  Alb  4.3  /  TBili  <0.2  /  DBili  x   /  AST  16  /  ALT  19  /  AlkPhos  102  11-11  POCT Blood Glucose.: 274 mg/dL (11 Nov 2023 14:48)  LIVER FUNCTIONS - ( 11 Nov 2023 15:32 )  Alb: 4.3 g/dL / Pro: 7.4 g/dL / ALK PHOS: 102 U/L / ALT: 19 U/L / AST: 16 U/L / GGT: x         PT/INR - ( 11 Nov 2023 18:50 )   PT: 10.8 sec;   INR: 0.95 ratio    PTT - ( 11 Nov 2023 18:50 )  PTT:30.3 sec    Radiology:  CTV  Occlusion of the RIGHT internal jugular bulb with intraluminal thrombus   extending into the proximal cervical RIGHT internal jugular vein.   Occlusion of the RIGHT sigmoid sinus and nonocclusive thrombus extending   into the distal RIGHT transverse sinus.    CT IAC  Venous thrombosis within the right superior internal jugular vein,   sigmoid sinus and lateral transverse sinus.  Recommend CT venogram of the head and neck to due to limited   field-of-view on this temporal bone study. Recommend MRI brain to rule   out venous infarct.

## 2023-11-11 NOTE — H&P ADULT - NSHPLABSRESULTS_GEN_ALL_CORE
LABS:                          12.5   9.76  )-----------( 202      ( 11 Nov 2023 15:32 )             38.3     11-11    136  |  98  |  12  ----------------------------<  223<H>  4.3   |  25  |  0.63    Ca    9.8      11 Nov 2023 15:32    TPro  7.4  /  Alb  4.3  /  TBili  <0.2  /  DBili  x   /  AST  16  /  ALT  19  /  AlkPhos  102  11-11    PT/INR - ( 11 Nov 2023 18:50 )   PT: 10.8 sec;   INR: 0.95 ratio    PTT - ( 11 Nov 2023 18:50 )  PTT:30.3 sec    CT internal auditory canal 11/11/23:    Venous thrombosis within the right superior internal jugular vein,   sigmoid sinus and lateral transverse sinus.  Recommend CT venogram of the head and neck to due to limited   field-of-view on this temporal bone study. Recommend MRI brain to rule   out venous infarct.    CT venogram brain 11/11/23:    Occlusion of the RIGHT internal jugular bulb with intraluminal thrombus   extending into the proximal cervical RIGHT internal jugular vein.   Occlusion of the RIGHT sigmoid sinus and nonocclusive thrombus extending   into the distal RIGHT transverse sinus. LABS:                          12.5   9.76  )-----------( 202      ( 11 Nov 2023 15:32 )             38.3     11-11    136  |  98  |  12  ----------------------------<  223<H>  4.3   |  25  |  0.63    Ca    9.8      11 Nov 2023 15:32    TPro  7.4  /  Alb  4.3  /  TBili  <0.2  /  DBili  x   /  AST  16  /  ALT  19  /  AlkPhos  102  11-11    PT/INR - ( 11 Nov 2023 18:50 )   PT: 10.8 sec;   INR: 0.95 ratio    PTT - ( 11 Nov 2023 18:50 )  PTT:30.3 sec    CT internal auditory canal 11/11/23:    Venous thrombosis within the right superior internal jugular vein,   sigmoid sinus and lateral transverse sinus.  Recommend CT venogram of the head and neck to due to limited   field-of-view on this temporal bone study. Recommend MRI brain to rule   out venous infarct.    CT venogram brain 11/11/23:    Occlusion of the RIGHT internal jugular bulb with intraluminal thrombus   extending into the proximal cervical RIGHT internal jugular vein.   Occlusion of the RIGHT sigmoid sinus and nonocclusive thrombus extending   into the distal RIGHT transverse sinus.    EKG Reviewed: Normal sinus rhythm. QTc 464 Labs personally reviewed.                        12.5   9.76  )-----------( 202      ( 11 Nov 2023 15:32 )             38.3     11-11    136  |  98  |  12  ----------------------------<  223<H>  4.3   |  25  |  0.63    Ca    9.8      11 Nov 2023 15:32    TPro  7.4  /  Alb  4.3  /  TBili  <0.2  /  DBili  x   /  AST  16  /  ALT  19  /  AlkPhos  102  11-11    PT/INR - ( 11 Nov 2023 18:50 )   PT: 10.8 sec;   INR: 0.95 ratio    PTT - ( 11 Nov 2023 18:50 )  PTT:30.3 sec    Imaging personally reviewed.  CT internal auditory canal 11/11/23:    Venous thrombosis within the right superior internal jugular vein,   sigmoid sinus and lateral transverse sinus.  Recommend CT venogram of the head and neck to due to limited   field-of-view on this temporal bone study. Recommend MRI brain to rule   out venous infarct.    CT venogram brain 11/11/23:    Occlusion of the RIGHT internal jugular bulb with intraluminal thrombus   extending into the proximal cervical RIGHT internal jugular vein.   Occlusion of the RIGHT sigmoid sinus and nonocclusive thrombus extending   into the distal RIGHT transverse sinus.    EKG personally reviewed: Normal sinus rhythm. QTc 464 ms

## 2023-11-11 NOTE — H&P ADULT - ATTENDING COMMENTS
47 yo woman with a history of R breast cancer (dx 2017 s/p lumpectomy, radiation tx in 2023, in remission on anastrozole),Type 2 DM (on Metformin only) presents with headache and right-sided ear pain for the past 3 weeks. CT venogram with occlusion of the RIGHT internal jugular bulb with intraluminal thrombus extending into the proximal cervical RIGHT internal jugular vein as well as occlusion of the RIGHT sigmoid sinus and nonocclusive thrombus extending into the distal RIGHT transverse sinus. Started on heparin gtt. Neurology following. On q4hr neuro checks and pending MRI brain to evaluate for possible hemorrhage vs. infarct. Neurosurgery contacted overnight but states no role for neurosurgery at this time. CTA chest and b/l LE venous dopplers pending for further eval of thromboses. Anastrozole currently on hold, will reach out to pt's oncology team to see if pt should c/w anastrazole at this time. Ophthalmology to be called in AM for R eye pain.

## 2023-11-11 NOTE — H&P ADULT - PROBLEM SELECTOR PLAN 3
Pt with history of Type 2 DM on home ______.    Plan:  Obtain HbA1c with AM labs  Monitor FS AC and QHS  Low dose ISS Pt with history of R breast cancer dx in 2017 (s/p lumpectomy and radiation), follows at [facility]. Reports has been in remission on mammogram outpatient in June 2023.    Plan:  Hold anastrazole 1 mg while inpatient  CT chest to evaluate for recurrence/progression of malignancy  Evaluate for CT PE  continue heme/onc f/u Patient reports R sided eye pain associated with headache, impoved since receiving toradol in ED and starting heparin gtt for R IJ thrombus w  extension into the proximal cervical R IJ vein, occlusion of the R sigmoid sinus and distal RIGHT transverse sinus. Reports is nearsighted at baseline but denies acute visual changes or eye pressure.    Plan:  Ophthalmology consult in AM to evaluate for R papilledema Patient reports R sided eye pain associated with headache, improved since receiving toradol in ED and starting heparin gtt for R IJ thrombus w  extension into the proximal cervical R IJ vein, occlusion of the R sigmoid sinus and distal RIGHT transverse sinus. Reports is nearsighted at baseline but denies acute visual changes or eye pressure.    Plan:  Ophthalmology consult in AM to evaluate for R papilledema Patient reports R sided eye pain associated with headache, improved since receiving Toradol in ED and starting heparin gtt for R IJ thrombus w extension into the proximal cervical R IJ vein, occlusion of the R sigmoid sinus and distal RIGHT transverse sinus. Reports is nearsighted at baseline but denies acute visual changes or eye pressure.    Plan:  Ophthalmology consult to be called in AM to evaluate for R papilledema

## 2023-11-11 NOTE — ED PROVIDER NOTE - CLINICAL SUMMARY MEDICAL DECISION MAKING FREE TEXT BOX
48-year-old female history of diabetes presents emergency room complaining of right ear pain x2 weeks with radiation to temple and jaw and positive mild mastoid tenderness  Concern for malignant otitis versus mastoiditis versus trigeminal neuralgia versus migraine  Labs CT IAC  IV fluids Toradol Reglan  Reassess

## 2023-11-11 NOTE — H&P ADULT - NSHPSOCIALHISTORY_GEN_ALL_CORE
Patient denies any history of tobacco, alcohol use, or illicit or recreational drug use. Is independent in all ADLs and iADLs. Patient denies any history of tobacco, alcohol use, or illicit or recreational drug use. Is independent in all ADLs and iADLs. Lives at home with  and three children. Was born in [place] and immig[place] to the [place] in [place]. Patient denies any history of tobacco, alcohol, or illicit or recreational drug use. Is independent in all ADLs and iADLs. Lives at home with  and three children. Was born in [place] and immig[place] to [place] in [place].

## 2023-11-11 NOTE — ED PROVIDER NOTE - ATTENDING APP SHARED VISIT CONTRIBUTION OF CARE
I performed a face to face evaluation of this patient and obtained a history and performed a full exam.  I agree with the history, physical exam and plan of the YORDY

## 2023-11-11 NOTE — ED ADULT NURSE NOTE - NSFALLHARMRISKINTERV_ED_ALL_ED

## 2023-11-11 NOTE — H&P ADULT - PROBLEM SELECTOR PLAN 5
DVT ppx: heparin gtt  Diet: Consistent carb halal diet  Dispo: pending course and clinical improvement Pt with history of Type 2 DM on home metformin 500 mg BID    Plan:  Hold home metformin while inpatient  Obtain HbA1c with AM labs  Monitor FS AC and QHS  Low dose ISS Pt with history of Type 2 DM on home metformin 500 mg BID    Plan:  Hold home metformin while inpatient  Obtain HbA1c with AM labs  Monitor FS QAC TID and QHS  Start low-dose ISS QAC TID and QHS

## 2023-11-11 NOTE — ED ADULT NURSE REASSESSMENT NOTE - NS ED NURSE REASSESS COMMENT FT1
Patient received from Results Waiting RN Reyna. She is in stable condition, pending CT and further orders.

## 2023-11-11 NOTE — ED ADULT TRIAGE NOTE - CHIEF COMPLAINT QUOTE
Pt reporting to the ed for R ear pain X 3 weeks, seen at urgent care and given ear drops with no relief. Reports pain radiating to head and down neck. pmh of breast cancer not on chemo and DM.

## 2023-11-11 NOTE — H&P ADULT - NSHPPHYSICALEXAM_GEN_ALL_CORE
VITALS:   T(C): 36.6 (11-11-23 @ 17:37), Max: 36.8 (11-11-23 @ 14:45)  HR: 68 (11-11-23 @ 21:50) (67 - 98)  BP: 118/74 (11-11-23 @ 21:50) (115/73 - 158/98)  RR: 18 (11-11-23 @ 21:50) (18 - 18)  SpO2: 99% (11-11-23 @ 21:50) (98% - 100%)    GENERAL: NAD, lying in bed comfortably  HEAD:  Atraumatic, normocephalic  EYES: EOMI, PERRLA, conjunctiva and sclera clear  ENT: Moist mucous membranes  NECK: Supple, no JVD  HEART: Regular rate and rhythm, no murmurs, rubs, or gallops  LUNGS: Unlabored respirations.  Clear to auscultation bilaterally, no crackles, wheezing, or rhonchi  ABDOMEN: Soft, nontender, nondistended, +BS  EXTREMITIES: 2+ peripheral pulses bilaterally. No clubbing, cyanosis, or edema  NERVOUS SYSTEM:  A&Ox3, no focal deficits   SKIN: No rashes or lesions VITALS:   T(C): 36.6 (11-11-23 @ 17:37), Max: 36.8 (11-11-23 @ 14:45)  HR: 68 (11-11-23 @ 21:50) (67 - 98)  BP: 118/74 (11-11-23 @ 21:50) (115/73 - 158/98)  RR: 18 (11-11-23 @ 21:50) (18 - 18)  SpO2: 99% (11-11-23 @ 21:50) (98% - 100%)    GENERAL: NAD, lying in bed comfortably  HEAD:  Atraumatic, normocephalic  EYES: EOMI, PERRLA, conjunctiva and sclera clear  ENT: Moist mucous membranes, R sided pinna tender to palpation  NECK: Supple, no JVD, +TTP of R lateral neck/ SCM/ trapezius  HEART: Regular rate and rhythm, no murmurs, rubs, or gallops  LUNGS: Unlabored respirations.  Clear to auscultation bilaterally, no crackles, wheezing, or rhonchi  ABDOMEN: Soft, nontender, nondistended, +BS  EXTREMITIES: 2+ peripheral pulses bilaterally. No clubbing, cyanosis, or edema  NERVOUS SYSTEM:  A&Ox3, no focal deficits , CN II-XII grossly intact, strength 5/5 in b/l upper and lower extremities, sensation intact in b/l upper and lower extremities, intact finger-nose testing, no tremors, no dysmetria  SKIN: No rashes or lesions VITALS:   T(C): 36.6 (11-11-23 @ 17:37), Max: 36.8 (11-11-23 @ 14:45)  HR: 68 (11-11-23 @ 21:50) (67 - 98)  BP: 118/74 (11-11-23 @ 21:50) (115/73 - 158/98)  RR: 18 (11-11-23 @ 21:50) (18 - 18)  SpO2: 99% (11-11-23 @ 21:50) (98% - 100%)    GENERAL: NAD, lying in bed comfortably  HEAD:  Atraumatic, normocephalic  EYES: EOMI, PERRLA, conjunctiva and sclera clear  ENT: Moist mucous membranes, R sided pinna tender to palpation  NECK: Supple, no JVD, +TTP of R lateral neck/ SCM/ trapezius  HEART: Regular rate and rhythm, no murmurs, rubs, or gallops  LUNGS: Unlabored respirations.  Clear to auscultation bilaterally, no crackles, wheezing, or rhonchi  ABDOMEN: Soft, nontender, nondistended, +BS  EXTREMITIES: 2+ peripheral pulses bilaterally. No clubbing, cyanosis, or edema  NERVOUS SYSTEM:  A&Ox3, no focal deficits , CN II-XII grossly intact, strength 5/5 in b/l upper and lower extremities, sensation intact in b/l upper and lower extremities, intact finger-nose testing, no tremors, no dysmetria, no facial droop  SKIN: No rashes or lesions

## 2023-11-11 NOTE — H&P ADULT - NSHPREVIEWOFSYSTEMS_GEN_ALL_CORE
REVIEW OF SYSTEMS:    CONSTITUTIONAL: No weakness, fevers or chills  EYES/ENT: No visual changes;  No vertigo or throat pain   NECK: No pain or stiffness  RESPIRATORY: No cough, wheezing, hemoptysis; No shortness of breath  CARDIOVASCULAR: No chest pain or palpitations  GASTROINTESTINAL: No abdominal or epigastric pain. No nausea, vomiting, or hematemesis; No diarrhea or constipation. No melena or hematochezia.  GENITOURINARY: No dysuria, frequency or hematuria  NEUROLOGICAL: No numbness or weakness  SKIN: No itching, rashes REVIEW OF SYSTEMS:    CONSTITUTIONAL: No weakness, fevers or chills,   EYES/ENT: No visual changes;  No vertigo or throat pain   NECK: +R sided neck pain, no stiffness  RESPIRATORY: No cough, wheezing, hemoptysis; No shortness of breath  CARDIOVASCULAR: No chest pain or palpitations  GASTROINTESTINAL: No abdominal or epigastric pain. No nausea, vomiting, or hematemesis; No diarrhea or constipation. No melena or hematochezia.  GENITOURINARY: No dysuria, frequency or hematuria  NEUROLOGICAL: No numbness or weakness, +R sided headache  SKIN: No itching, rashes

## 2023-11-11 NOTE — ED PROVIDER NOTE - NS ED ATTENDING STATEMENT MOD
This was a shared visit with the YORDY. I reviewed and verified the documentation and independently performed the documented:

## 2023-11-11 NOTE — ED PROVIDER NOTE - OBJECTIVE STATEMENT
48-year-old female past medical history of diabetes presents emergency room complaining of right ear pain x2 weeks.  Patient stated for the past 2 weeks has had right-sided ear pain radiating up to the temple and down to her right jaw patient went to urgent care last week and prescribed eardrops with minimal relief patient states pain is worse feels pain behind urine from ear as well and is very tender to palpation.  Patient denies fevers chills nausea vomiting hearing changes discharge or bleeding from ear.

## 2023-11-11 NOTE — H&P ADULT - PROBLEM SELECTOR PROBLEM 6
Need for prophylactic measure Dapsone Pregnancy And Lactation Text: This medication is Pregnancy Category C and is not considered safe during pregnancy or breast feeding.

## 2023-11-11 NOTE — H&P ADULT - PROBLEM SELECTOR PLAN 1
As pt with hx of diabetes, concern for malignant otits externa, but no evidence of infection on CT IAC. No leukocytosis, no fevers or chills, no drainage from the ear. CT venogram and IAC showing R internal jugular bulb with intraluminal thrombus extending into the proximal cervical R internal jugular vein. Occlusion of the R sigmoid sinus and nonocclusive thrombus extending into the distal RIGHT transverse sinus.    Plan:  Monitoring off of abx at this time  Monitor wbc and fever curve  Tylenol 650 mg q6 PRN for mild pain, oxy 5 mg q6 PRN for severe pain  Continue heparin gtt for thrombus As pt with hx of diabetes, concern for malignant otitis externa, but no evidence of infection on CT IAC. No leukocytosis, no fevers or chills, no drainage from the ear. CT venogram and IAC showing R internal jugular bulb with intraluminal thrombus extending into the proximal cervical R internal jugular vein. Occlusion of the R sigmoid sinus and nonocclusive thrombus extending into the distal RIGHT transverse sinus.    Plan:  Monitoring off of abx at this time  Monitor WBC and fever curve  Tylenol 650 mg q6hrs PRN for mild-moderate pain and Tramadol 50 mg q6hrs PRN for severe pain  Continue heparin gtt for thrombus as below

## 2023-11-11 NOTE — H&P ADULT - PROBLEM SELECTOR PLAN 6
DVT ppx: heparin gtt  Diet: Consistent carb halal diet  Dispo: pending course and clinical improvement DVT ppx: On heparin gtt  Diet: Consistent carb halal diet  Dispo: pending course and clinical improvement

## 2023-11-11 NOTE — CONSULT NOTE ADULT - TIME BILLING
48-year-old ? handed lady evaluated at [facility] on 11/12/2023 with headache.  History and exam as above.  ROS otherwise negative.  CT brain and IACs with contrast (11/11/2023) to my eye right transverse/sigmoid sinus and right internal jugular vein thrombosis, and was otherwise unremarkable.  CTV head (11/11/2023) to my eye showed similar findings of right transverse/sigmoid sinus and right IJ vein thrombosis.    Impression.  Right ear/mastoid and right frontal headache for 3 weeks, most likely due to right transverse/sigmoid sinus and right internal jugular vein thrombosis.  It is unclear whether this venous thrombosis could be considered "idiopathic", but she is taking anastrozole for breast cancer which may be contributory.  An underlying hypercoagulable state may also be considered.  It may be helpful to have ophthalmology examine her to check for papilledema.  Suggest.  Elective MRI brain without contrast/MRV head with contrast can be done as inpatient or outpatient; elective ophthalmology consultation as inpatient or outpatient; hypercoagulability profile can be drawn, although some testing will not be reliable while being anticoagulated, so some of this blood work may have to be repeated when she is off anticoagulation; will have to electively discuss with her oncologist the risks/benefits of continuing anastrozole; currently on IV heparin; can transition from IV heparin to a DOAC for 3-6 months with follow-up venous imaging at that time; from neurovascular standpoint, cleared for discharge.

## 2023-11-11 NOTE — H&P ADULT - HISTORY OF PRESENT ILLNESS
48-year-old female past medical history of Type 2 DM presents with right ear pain x2 weeks.  Patient states that for the past 2 weeks has had right-sided ear pain, the ear is painful posteriorly and is very tender to palpation, radiating up to the temple and down to her right jaw. She went to urgent care last week and was prescribed eardrops with minimal relief.  Patient denies ear discharge or bleeding, auditory changes, fevers, chills, nausea, vomiting,    ED vitals: T 98.3 HR 98 /98 RR 18 Sa 100% on RA  CT internal auditory canal w/ IV contrast and CT venogram brain w/ IV contrast:   In ED, received 1L NS, toradol, reglan. Started on heparin gtt 48-year-old female past medical history of breast cancer (dx in 2017, s/p lumpectomy and radiation therapy, on anastrazole 1 mg daily), Type 2 DM presents with a worsening headache and right ear pain x3 weeks.  Patient states that for the past 3 weeks has had right-sided headache and ear pain that was intermittent but has worsened over the past 2 days. Notes the pain is worse when lying down and sitting upright. Also reports ear is painful posteriorly and is very tender to palpation, radiating up to the temple and down to her right jaw. She went to urgent care last week and was prescribed eardrops with minimal relief.  Patient denies ear discharge or bleeding, slurred speech, limb weakness, facial droop, auditory or visual changes, fevers, chills, nausea, vomiting, Denies any recent travel, prolonged immobilization, or sick contacts. Denies any history of DVTs or catheters in any extremities. No family history of blood clots. Was on aspirin 81 mg daily but discontinued it this year as she needs a new PCP, has never been on AC.    Reports she follows at [facility] for her breast cancer care and has been in remission. Gets yearly mammograms, recent one this year was in June 2023. Also reports she had a pap smear this year 2023 wnl.    ED vitals: T 98.3 HR 98 /98 RR 18 Sa 100% on RA  Labs: wbc 9.76, Hgb/Hct 12.5/38.3, Plt 202. Coags wnl- PT 10.8 PTT 30.3 INR 0.95, glucose 233  CT internal auditory canal w/ IV contrast and CT venogram brain w/ IV contrast revealing occlusion of the R IJ bulb with intraluminal thrombus extending into the proximal cervical R internal jugular vein. occlusion of the Rsigmoid sinus and nonocclusive thrombus extending into the distal R transverse sinus.    In ED, received 1L NS, toradol, reglan. Started on heparin gtt 48-year-old female past medical history of R sided breast cancer (dx in 2017, s/p lumpectomy and radiation therapy, on anastrazole 1 mg daily), type 2 DM presents with a worsening headache and right ear pain x3 weeks.  Patient states that for the past 3 weeks has had right-sided headache and ear pain that was intermittent but has worsened over the past 2 days. Notes the pain is worse when lying down and sitting upright. Also reports ear is painful posteriorly and is very tender to palpation, radiating up to the temple and down to her right jaw. She went to urgent care last week and was prescribed eardrops with minimal relief.  Patient denies ear discharge or bleeding, slurred speech, limb weakness, facial droop, auditory or visual changes, fevers, chills, nausea, vomiting, Denies any recent travel, prolonged immobilization, or sick contacts.     Denies any history of headaches, stroke, DVTs or catheters in any extremities. No family history of blood clots. Was on aspirin 81 mg daily but discontinued it this year, has never been on AC. Reports she follows at [facility] for her breast cancer care and has been in remission. Gets yearly mammograms, recent one this year was in June 2023 and no recurrence noted. Completed radiation in 2023 and has been on anastrazole 1 mg since April 2023. Also reports she had a pap smear this year 2023 wnl.    ED vitals: T 98.3 HR 98 /98 RR 18 Sa 100% on RA  Labs: wbc 9.76, Hgb/Hct 12.5/38.3, Plt 202. Coags wnl- PT 10.8 PTT 30.3 INR 0.95, glucose 233  CT internal auditory canal w/ IV contrast and CT venogram brain w/ IV contrast revealing occlusion of the R IJ bulb with intraluminal thrombus extending into the proximal cervical R internal jugular vein, occluding R sigmoid sinus and nonocclusive thrombus extending into the distal R transverse sinus.    In ED, received 1L NS, toradol, reglan. Started on heparin gtt 48-year-old female past medical history of R sided breast cancer (dx in 2017, s/p lumpectomy and radiation therapy, on anastrazole 1 mg daily), type 2 DM presents with a worsening headache and right ear pain x3 weeks.  Patient states that for the past 3 weeks has had right-sided headache and ear pain that was intermittent but has worsened over the past 2 days. Notes the pain is worse when lying down and sitting upright. Also reports ear is painful posteriorly and is very tender to palpation, radiating up to the temple and down to her right jaw. She went to urgent care last week and was prescribed eardrops with minimal relief.  Patient denies ear discharge or bleeding, slurred speech, limb weakness, facial droop, auditory or visual changes, fevers, chills, nausea, vomiting, weight changes. Denies any recent travel, prolonged immobilization, or sick contacts.     Denies any history of headaches, stroke, DVTs or catheters in any extremities. No family history of blood clots. Was on aspirin 81 mg daily but discontinued it this year, has never been on AC. Reports she follows at [facility] for her breast cancer care and has been in remission. Gets yearly mammograms, recent one this year was in June 2023 and no recurrence noted. Completed radiation in 2023 and has been on anastrazole 1 mg since April 2023. Also reports she had a pap smear this year 2023 wnl. Has not had a screening colonoscopy yet.    ED vitals: T 98.3 HR 98 /98 RR 18 Sa 100% on RA  Labs: wbc 9.76, Hgb/Hct 12.5/38.3, Plt 202. Coags wnl- PT 10.8 PTT 30.3 INR 0.95, glucose 233  CT internal auditory canal w/ IV contrast and CT venogram brain w/ IV contrast revealing occlusion of the R IJ bulb with intraluminal thrombus extending into the proximal cervical R internal jugular vein, occluding R sigmoid sinus and nonocclusive thrombus extending into the distal R transverse sinus.    In ED, received 1L NS, toradol, reglan. Started on heparin gtt 48-year-old female past medical history of R sided breast cancer (dx in 2017, s/p lumpectomy and radiation therapy, on anastrazole 1 mg daily), type 2 DM presents with a worsening headache and right ear pain x3 weeks. Patient states that for the past 3 weeks has had right-sided headache and ear pain that was intermittent but has worsened over the past 2 days. Notes the pain is worse when lying down and sitting upright. Also reports ear is painful posteriorly and is very tender to palpation, radiating up to the temple and down to her right jaw. She went to urgent care last week and was prescribed eardrops with minimal relief.  Patient denies ear discharge or bleeding, slurred speech, limb weakness, facial droop, auditory or visual changes, fevers, chills, nausea, vomiting, weight changes. Denies any recent travel, prolonged immobilization, or sick contacts.     Denies any history of headaches, stroke, DVTs or catheters in any extremities. No family history of blood clots. Was on aspirin 81 mg daily but discontinued it this year, has never been on AC. Reports she follows at [facility] for her breast cancer care and has been in remission. Gets yearly mammograms, recent one this year was in June 2023 and no recurrence noted. Completed radiation in 2023 and has been on anastrazole 1 mg since April 2023. Also reports she had a pap smear this year 2023 wnl. Has not had a screening colonoscopy yet.    ED vitals: T 98.3 HR 98 /98 RR 18 Sa 100% on RA  Labs: wbc 9.76, Hgb/Hct 12.5/38.3, Plt 202. Coags wnl- PT 10.8 PTT 30.3 INR 0.95, glucose 233  CT internal auditory canal w/ IV contrast and CT venogram brain w/ IV contrast revealing occlusion of the R IJ bulb with intraluminal thrombus extending into the proximal cervical R internal jugular vein, occluding R sigmoid sinus and nonocclusive thrombus extending into the distal R transverse sinus.    In ED, received 1L NS, toradol, reglan. Started on heparin gtt

## 2023-11-12 DIAGNOSIS — H57.11 OCULAR PAIN, RIGHT EYE: ICD-10-CM

## 2023-11-12 LAB
A1C WITH ESTIMATED AVERAGE GLUCOSE RESULT: 9.3 % — HIGH (ref 4–5.6)
A1C WITH ESTIMATED AVERAGE GLUCOSE RESULT: 9.3 % — HIGH (ref 4–5.6)
ALBUMIN SERPL ELPH-MCNC: 3.8 G/DL — SIGNIFICANT CHANGE UP (ref 3.3–5)
ALBUMIN SERPL ELPH-MCNC: 3.8 G/DL — SIGNIFICANT CHANGE UP (ref 3.3–5)
ALP SERPL-CCNC: 92 U/L — SIGNIFICANT CHANGE UP (ref 40–120)
ALP SERPL-CCNC: 92 U/L — SIGNIFICANT CHANGE UP (ref 40–120)
ALT FLD-CCNC: 14 U/L — SIGNIFICANT CHANGE UP (ref 4–33)
ALT FLD-CCNC: 14 U/L — SIGNIFICANT CHANGE UP (ref 4–33)
ANION GAP SERPL CALC-SCNC: 11 MMOL/L — SIGNIFICANT CHANGE UP (ref 7–14)
ANION GAP SERPL CALC-SCNC: 11 MMOL/L — SIGNIFICANT CHANGE UP (ref 7–14)
APTT BLD: 84.4 SEC — HIGH (ref 24.5–35.6)
APTT BLD: 84.4 SEC — HIGH (ref 24.5–35.6)
APTT BLD: 99.4 SEC — HIGH (ref 24.5–35.6)
APTT BLD: 99.4 SEC — HIGH (ref 24.5–35.6)
APTT BLD: >200 SEC — CRITICAL HIGH (ref 24.5–35.6)
APTT BLD: >200 SEC — CRITICAL HIGH (ref 24.5–35.6)
AST SERPL-CCNC: 14 U/L — SIGNIFICANT CHANGE UP (ref 4–32)
AST SERPL-CCNC: 14 U/L — SIGNIFICANT CHANGE UP (ref 4–32)
BILIRUB SERPL-MCNC: <0.2 MG/DL — SIGNIFICANT CHANGE UP (ref 0.2–1.2)
BILIRUB SERPL-MCNC: <0.2 MG/DL — SIGNIFICANT CHANGE UP (ref 0.2–1.2)
BUN SERPL-MCNC: 9 MG/DL — SIGNIFICANT CHANGE UP (ref 7–23)
BUN SERPL-MCNC: 9 MG/DL — SIGNIFICANT CHANGE UP (ref 7–23)
CALCIUM SERPL-MCNC: 9 MG/DL — SIGNIFICANT CHANGE UP (ref 8.4–10.5)
CALCIUM SERPL-MCNC: 9 MG/DL — SIGNIFICANT CHANGE UP (ref 8.4–10.5)
CHLORIDE SERPL-SCNC: 106 MMOL/L — SIGNIFICANT CHANGE UP (ref 98–107)
CHLORIDE SERPL-SCNC: 106 MMOL/L — SIGNIFICANT CHANGE UP (ref 98–107)
CHOLEST SERPL-MCNC: 229 MG/DL — HIGH
CHOLEST SERPL-MCNC: 229 MG/DL — HIGH
CO2 SERPL-SCNC: 23 MMOL/L — SIGNIFICANT CHANGE UP (ref 22–31)
CO2 SERPL-SCNC: 23 MMOL/L — SIGNIFICANT CHANGE UP (ref 22–31)
CREAT SERPL-MCNC: 0.61 MG/DL — SIGNIFICANT CHANGE UP (ref 0.5–1.3)
CREAT SERPL-MCNC: 0.61 MG/DL — SIGNIFICANT CHANGE UP (ref 0.5–1.3)
EGFR: 110 ML/MIN/1.73M2 — SIGNIFICANT CHANGE UP
EGFR: 110 ML/MIN/1.73M2 — SIGNIFICANT CHANGE UP
ESTIMATED AVERAGE GLUCOSE: 220 — SIGNIFICANT CHANGE UP
ESTIMATED AVERAGE GLUCOSE: 220 — SIGNIFICANT CHANGE UP
GLUCOSE BLDC GLUCOMTR-MCNC: 152 MG/DL — HIGH (ref 70–99)
GLUCOSE BLDC GLUCOMTR-MCNC: 152 MG/DL — HIGH (ref 70–99)
GLUCOSE BLDC GLUCOMTR-MCNC: 172 MG/DL — HIGH (ref 70–99)
GLUCOSE BLDC GLUCOMTR-MCNC: 172 MG/DL — HIGH (ref 70–99)
GLUCOSE BLDC GLUCOMTR-MCNC: 209 MG/DL — HIGH (ref 70–99)
GLUCOSE BLDC GLUCOMTR-MCNC: 209 MG/DL — HIGH (ref 70–99)
GLUCOSE BLDC GLUCOMTR-MCNC: 253 MG/DL — HIGH (ref 70–99)
GLUCOSE BLDC GLUCOMTR-MCNC: 253 MG/DL — HIGH (ref 70–99)
GLUCOSE BLDC GLUCOMTR-MCNC: 263 MG/DL — HIGH (ref 70–99)
GLUCOSE BLDC GLUCOMTR-MCNC: 263 MG/DL — HIGH (ref 70–99)
GLUCOSE SERPL-MCNC: 148 MG/DL — HIGH (ref 70–99)
GLUCOSE SERPL-MCNC: 148 MG/DL — HIGH (ref 70–99)
HCT VFR BLD CALC: 34 % — LOW (ref 34.5–45)
HCT VFR BLD CALC: 34 % — LOW (ref 34.5–45)
HCT VFR BLD CALC: 35.2 % — SIGNIFICANT CHANGE UP (ref 34.5–45)
HCT VFR BLD CALC: 35.2 % — SIGNIFICANT CHANGE UP (ref 34.5–45)
HDLC SERPL-MCNC: 68 MG/DL — SIGNIFICANT CHANGE UP
HDLC SERPL-MCNC: 68 MG/DL — SIGNIFICANT CHANGE UP
HGB BLD-MCNC: 11.1 G/DL — LOW (ref 11.5–15.5)
HGB BLD-MCNC: 11.1 G/DL — LOW (ref 11.5–15.5)
HGB BLD-MCNC: 11.7 G/DL — SIGNIFICANT CHANGE UP (ref 11.5–15.5)
HGB BLD-MCNC: 11.7 G/DL — SIGNIFICANT CHANGE UP (ref 11.5–15.5)
INR BLD: 0.99 RATIO — SIGNIFICANT CHANGE UP (ref 0.85–1.18)
INR BLD: 0.99 RATIO — SIGNIFICANT CHANGE UP (ref 0.85–1.18)
LIPID PNL WITH DIRECT LDL SERPL: 137 MG/DL — HIGH
LIPID PNL WITH DIRECT LDL SERPL: 137 MG/DL — HIGH
LUPUS ANTICOAGULANT PROFILE RESULT: SIGNIFICANT CHANGE UP
LUPUS ANTICOAGULANT PROFILE RESULT: SIGNIFICANT CHANGE UP
MAGNESIUM SERPL-MCNC: 2.1 MG/DL — SIGNIFICANT CHANGE UP (ref 1.6–2.6)
MAGNESIUM SERPL-MCNC: 2.1 MG/DL — SIGNIFICANT CHANGE UP (ref 1.6–2.6)
MCHC RBC-ENTMCNC: 24.1 PG — LOW (ref 27–34)
MCHC RBC-ENTMCNC: 32.6 GM/DL — SIGNIFICANT CHANGE UP (ref 32–36)
MCHC RBC-ENTMCNC: 32.6 GM/DL — SIGNIFICANT CHANGE UP (ref 32–36)
MCHC RBC-ENTMCNC: 33.2 GM/DL — SIGNIFICANT CHANGE UP (ref 32–36)
MCHC RBC-ENTMCNC: 33.2 GM/DL — SIGNIFICANT CHANGE UP (ref 32–36)
MCV RBC AUTO: 72.4 FL — LOW (ref 80–100)
MCV RBC AUTO: 72.4 FL — LOW (ref 80–100)
MCV RBC AUTO: 73.8 FL — LOW (ref 80–100)
MCV RBC AUTO: 73.8 FL — LOW (ref 80–100)
NON HDL CHOLESTEROL: 161 MG/DL — HIGH
NON HDL CHOLESTEROL: 161 MG/DL — HIGH
NRBC # BLD: 0 /100 WBCS — SIGNIFICANT CHANGE UP (ref 0–0)
NRBC # FLD: 0 K/UL — SIGNIFICANT CHANGE UP (ref 0–0)
PHOSPHATE SERPL-MCNC: 4.2 MG/DL — SIGNIFICANT CHANGE UP (ref 2.5–4.5)
PHOSPHATE SERPL-MCNC: 4.2 MG/DL — SIGNIFICANT CHANGE UP (ref 2.5–4.5)
PLATELET # BLD AUTO: 171 K/UL — SIGNIFICANT CHANGE UP (ref 150–400)
PLATELET # BLD AUTO: 171 K/UL — SIGNIFICANT CHANGE UP (ref 150–400)
PLATELET # BLD AUTO: 188 K/UL — SIGNIFICANT CHANGE UP (ref 150–400)
PLATELET # BLD AUTO: 188 K/UL — SIGNIFICANT CHANGE UP (ref 150–400)
POTASSIUM SERPL-MCNC: 4.1 MMOL/L — SIGNIFICANT CHANGE UP (ref 3.5–5.3)
POTASSIUM SERPL-MCNC: 4.1 MMOL/L — SIGNIFICANT CHANGE UP (ref 3.5–5.3)
POTASSIUM SERPL-SCNC: 4.1 MMOL/L — SIGNIFICANT CHANGE UP (ref 3.5–5.3)
POTASSIUM SERPL-SCNC: 4.1 MMOL/L — SIGNIFICANT CHANGE UP (ref 3.5–5.3)
PROT SERPL-MCNC: 6.4 G/DL — SIGNIFICANT CHANGE UP (ref 6–8.3)
PROT SERPL-MCNC: 6.4 G/DL — SIGNIFICANT CHANGE UP (ref 6–8.3)
PROTHROM AB SERPL-ACNC: 11.1 SEC — SIGNIFICANT CHANGE UP (ref 9.5–13)
PROTHROM AB SERPL-ACNC: 11.1 SEC — SIGNIFICANT CHANGE UP (ref 9.5–13)
RBC # BLD: 4.61 M/UL — SIGNIFICANT CHANGE UP (ref 3.8–5.2)
RBC # BLD: 4.61 M/UL — SIGNIFICANT CHANGE UP (ref 3.8–5.2)
RBC # BLD: 4.86 M/UL — SIGNIFICANT CHANGE UP (ref 3.8–5.2)
RBC # BLD: 4.86 M/UL — SIGNIFICANT CHANGE UP (ref 3.8–5.2)
RBC # FLD: 15.3 % — HIGH (ref 10.3–14.5)
SODIUM SERPL-SCNC: 140 MMOL/L — SIGNIFICANT CHANGE UP (ref 135–145)
SODIUM SERPL-SCNC: 140 MMOL/L — SIGNIFICANT CHANGE UP (ref 135–145)
TRIGL SERPL-MCNC: 122 MG/DL — SIGNIFICANT CHANGE UP
TRIGL SERPL-MCNC: 122 MG/DL — SIGNIFICANT CHANGE UP
WBC # BLD: 6.71 K/UL — SIGNIFICANT CHANGE UP (ref 3.8–10.5)
WBC # BLD: 6.71 K/UL — SIGNIFICANT CHANGE UP (ref 3.8–10.5)
WBC # BLD: 7.15 K/UL — SIGNIFICANT CHANGE UP (ref 3.8–10.5)
WBC # BLD: 7.15 K/UL — SIGNIFICANT CHANGE UP (ref 3.8–10.5)
WBC # FLD AUTO: 6.71 K/UL — SIGNIFICANT CHANGE UP (ref 3.8–10.5)
WBC # FLD AUTO: 6.71 K/UL — SIGNIFICANT CHANGE UP (ref 3.8–10.5)
WBC # FLD AUTO: 7.15 K/UL — SIGNIFICANT CHANGE UP (ref 3.8–10.5)
WBC # FLD AUTO: 7.15 K/UL — SIGNIFICANT CHANGE UP (ref 3.8–10.5)

## 2023-11-12 PROCEDURE — 99233 SBSQ HOSP IP/OBS HIGH 50: CPT

## 2023-11-12 PROCEDURE — 93970 EXTREMITY STUDY: CPT | Mod: 26

## 2023-11-12 PROCEDURE — 93010 ELECTROCARDIOGRAM REPORT: CPT

## 2023-11-12 PROCEDURE — 99223 1ST HOSP IP/OBS HIGH 75: CPT

## 2023-11-12 RX ORDER — ACETAMINOPHEN 500 MG
650 TABLET ORAL EVERY 6 HOURS
Refills: 0 | Status: DISCONTINUED | OUTPATIENT
Start: 2023-11-12 | End: 2023-11-15

## 2023-11-12 RX ORDER — INSULIN LISPRO 100/ML
VIAL (ML) SUBCUTANEOUS AT BEDTIME
Refills: 0 | Status: DISCONTINUED | OUTPATIENT
Start: 2023-11-12 | End: 2023-11-15

## 2023-11-12 RX ORDER — KETOROLAC TROMETHAMINE 30 MG/ML
30 SYRINGE (ML) INJECTION ONCE
Refills: 0 | Status: DISCONTINUED | OUTPATIENT
Start: 2023-11-12 | End: 2023-11-12

## 2023-11-12 RX ORDER — INSULIN GLARGINE 100 [IU]/ML
5 INJECTION, SOLUTION SUBCUTANEOUS AT BEDTIME
Refills: 0 | Status: DISCONTINUED | OUTPATIENT
Start: 2023-11-12 | End: 2023-11-15

## 2023-11-12 RX ORDER — DEXTROSE 50 % IN WATER 50 %
15 SYRINGE (ML) INTRAVENOUS ONCE
Refills: 0 | Status: DISCONTINUED | OUTPATIENT
Start: 2023-11-12 | End: 2023-11-15

## 2023-11-12 RX ORDER — GLUCAGON INJECTION, SOLUTION 0.5 MG/.1ML
1 INJECTION, SOLUTION SUBCUTANEOUS ONCE
Refills: 0 | Status: DISCONTINUED | OUTPATIENT
Start: 2023-11-12 | End: 2023-11-15

## 2023-11-12 RX ORDER — SODIUM CHLORIDE 9 MG/ML
1000 INJECTION, SOLUTION INTRAVENOUS
Refills: 0 | Status: DISCONTINUED | OUTPATIENT
Start: 2023-11-12 | End: 2023-11-15

## 2023-11-12 RX ORDER — DEXTROSE 50 % IN WATER 50 %
25 SYRINGE (ML) INTRAVENOUS ONCE
Refills: 0 | Status: DISCONTINUED | OUTPATIENT
Start: 2023-11-12 | End: 2023-11-15

## 2023-11-12 RX ORDER — TRAMADOL HYDROCHLORIDE 50 MG/1
50 TABLET ORAL EVERY 6 HOURS
Refills: 0 | Status: DISCONTINUED | OUTPATIENT
Start: 2023-11-12 | End: 2023-11-15

## 2023-11-12 RX ORDER — SENNA PLUS 8.6 MG/1
2 TABLET ORAL AT BEDTIME
Refills: 0 | Status: DISCONTINUED | OUTPATIENT
Start: 2023-11-12 | End: 2023-11-15

## 2023-11-12 RX ORDER — DEXTROSE 50 % IN WATER 50 %
12.5 SYRINGE (ML) INTRAVENOUS ONCE
Refills: 0 | Status: DISCONTINUED | OUTPATIENT
Start: 2023-11-12 | End: 2023-11-15

## 2023-11-12 RX ORDER — INSULIN LISPRO 100/ML
VIAL (ML) SUBCUTANEOUS
Refills: 0 | Status: DISCONTINUED | OUTPATIENT
Start: 2023-11-12 | End: 2023-11-15

## 2023-11-12 RX ADMIN — HEPARIN SODIUM 800 UNIT(S)/HR: 5000 INJECTION INTRAVENOUS; SUBCUTANEOUS at 03:35

## 2023-11-12 RX ADMIN — HEPARIN SODIUM 800 UNIT(S)/HR: 5000 INJECTION INTRAVENOUS; SUBCUTANEOUS at 17:29

## 2023-11-12 RX ADMIN — HEPARIN SODIUM 800 UNIT(S)/HR: 5000 INJECTION INTRAVENOUS; SUBCUTANEOUS at 21:36

## 2023-11-12 RX ADMIN — Medication 30 MILLIGRAM(S): at 13:02

## 2023-11-12 RX ADMIN — Medication 1: at 22:19

## 2023-11-12 RX ADMIN — Medication 1: at 09:50

## 2023-11-12 RX ADMIN — INSULIN GLARGINE 5 UNIT(S): 100 INJECTION, SOLUTION SUBCUTANEOUS at 22:18

## 2023-11-12 RX ADMIN — HEPARIN SODIUM 800 UNIT(S)/HR: 5000 INJECTION INTRAVENOUS; SUBCUTANEOUS at 10:34

## 2023-11-12 RX ADMIN — HEPARIN SODIUM 800 UNIT(S)/HR: 5000 INJECTION INTRAVENOUS; SUBCUTANEOUS at 19:31

## 2023-11-12 RX ADMIN — Medication 30 MILLIGRAM(S): at 11:42

## 2023-11-12 RX ADMIN — HEPARIN SODIUM 0 UNIT(S)/HR: 5000 INJECTION INTRAVENOUS; SUBCUTANEOUS at 02:32

## 2023-11-12 RX ADMIN — Medication 3: at 12:54

## 2023-11-12 RX ADMIN — Medication 1: at 17:30

## 2023-11-12 NOTE — PATIENT PROFILE ADULT - HAS THE PATIENT RECEIVED THE INFLUENZA VACCINE THIS SEASON?
CHIEF COMPLAINT:    Follow up sleep apnea.    HISTORY:     This is a 50 year old male who is here for a 2 months follow up of sleep apnea after receiving a new CPAP device  Reports using CPAP regularly with good tolerance  Sleeps well through the night and wakes up refreshed with no fatigue or drowsiness most of the days  Denies any issues or concerns with the use of the CPAP device or the mask      PAST MEDICAL HISTORY:      Atrial fibrillation (CMS/[facility])                                 Tobacco use                                                   Cellulitis                                                    Anxiety                                                       Depression                                                    Colon polyps                                                  Alcohol abuse                                                   Comment: Quit in 2013.    Chronic venous stasis dermatitis                              History of recreational drug use                                Comment: Cocaine and marijuana. Quit around 1996.    Hyperlipidemia                                                Sepsis (CMS/HCC)                                04/29/2017      Comment: Secondary to cellulitis    Nummular eczema                                 09/13/2017    Diverticulosis                                  09/2016         Comment: Mild diverticulosis in descending colon on                colonoscopy    Morbid obesity (CMS/HCC)                                      Renal cyst                                                      Comment: 1.5 cm on CT on right 07/2018    Hiatal hernia                                                   Comment: on CT 7/2018    Spondylosis, thoracic, without myelopathy       9/28/2018       Comment: Noted on x-rays 9/28/2018.    Hypertension                                                  Venous insufficiency                                          Impaired fasting glucose                                       Restless leg syndrome                                         Vitamin D deficiency                                          Obstructive sleep apnea on CPAP                                 Comment: uses c-pap    MEDICATIONS:    Current Outpatient Medications   Medication Sig Dispense Refill   • lisinopril-hydroCHLOROthiazide (ZESTORETIC) 20-25 MG per tablet TAKE 1 TABLET BY MOUTH DAILY 90 tablet 0   • dilTIAZem (Dilt-XR) 180 MG 24 hr capsule Take 1 capsule by mouth daily. 90 capsule 1   • warfarin (COUMADIN) 5 MG tablet TAKE 2 TABLETS BY MOUTH EVERY MONDAY, WEDNESDAY AND FRIDAY. TAKE 1 TABLET ON THE REMAINING DAYS OF THE WEEK. 130 tablet 1   • atorvastatin (LIPITOR) 20 MG tablet TAKE 1 TABLET BY MOUTH DAILY 90 tablet 0   • methylPREDNISolone (MEDROL DOSEPAK) 4 MG tablet Take 1 tablet by mouth as directed. follow package directions 21 tablet 0   • buPROPion XL (WELLBUTRIN XL) 300 MG 24 hr tablet TAKE 1 TABLET BY MOUTH DAILY. TAKE WITH A 150MG TABLET FOR A TOTAL OF 450MG DAILY 90 tablet 1   • warfarin (COUMADIN) 2 MG tablet Take 12 mg (5mg x2 and 2 mg x1) on Mondays, Wednesdays, and Fridays and Take 10 mg (5 mg x2) all remaining days or as directed by your INR clinic.  Patient also has 5 mg tablets 30 tablet 0   • liraglutide - weight management (SAXENDA) 18 MG/3ML pen-injector Inject 3 mg into the skin daily. 45 mL 1   • Insulin Pen Needle 32G X 6 MM Misc Use to inject Saxenda daily. Remove needle cover(s) to expose needle before injecting. 90 each 0   • Vitamin D, Ergocalciferol, 1.25 mg (50,000 units) capsule Take 1 capsule by mouth 1 day a week. 12 capsule 1   • Alcohol Swabs (Alcohol Wipes) 70 % Pads Use as needed prior to saxenda injections. 50 each 11   • clonazePAM (KlonoPIN) 1 MG tablet Take 1-2 tablets by mouth daily as needed for Anxiety. 30 tablet 0   • buPROPion XL (Wellbutrin XL) 150 MG 24 hr tablet Take 1 tablet by mouth daily. Take with a 300 mg tablet for a total of  450 mg daily. 90 tablet 3   • DISPENSE Bilateral compression garments, 20-30 mmHg. Diagnosis: I89.0. 1 Units 3   • ketoconazole (NIZORAL) 2 % cream Apply topically 2 times daily. (Patient taking differently: Apply topically as needed.) 60 g 1   • hydroCORTisone (ANUSOL-HC) 2.5 % rectal cream Apply twice a day as needed. 30 g 1     No current facility-administered medications for this visit.       ALLERGIES:    ALLERGIES:  No Known Allergies      I have reviewed family and social history in Epic.      REVIEW OF SYSTEMS:    Constitutional:  Denies fever or chills   Eyes:  Denies change in visual acuity   HENT:  Denies nasal congestion or sore throat   Respiratory:  Denies cough or shortness of breath   Cardiovascular:  Denies chest pain or edema     PHYSICAL EXAM:    Visit Vitals  BP (!) 144/80   Pulse 73   Temp 97.6 °F (36.4 °C)   Resp 16   Ht 5' 9.25\" (1.759 m)   Wt (!) 214.1 kg (472 lb)   SpO2 97%   BMI 69.20 kg/m²       Constitutional:  Well developed, well nourished, no acute distress, non-toxic appearance   Eyes:  PERRL(Pupils equal, round, reactive to light), conjunctivae normal   Head, Ears, Nose, Throat:  Atraumatic, external ears normal, nose normal, oropharynx moist, no pharyngeal exudates. Neck- normal range of motion, no tenderness, supple   Respiratory:  No respiratory distress, normal breath sounds, no rales, no wheezing   Cardiovascular:  Normal rate, normal rhythm, no murmurs, no gallops, no rubs       ASSESSMENT:    1. Obstructive sleep apnea on CPAP    2. CPAP use counseling          PLAN:    CPAP data reviewed and discussed with the patient  Currently on auto CPAP at 12-20 cm H2O  Average daily usage is 7 hour 28 minutes  Average apnea-hypopnea index is 0.1 per hour  Mean leakage from the mask is 2.9 liters/minute and 95th percentile leakage is 29 liters/minute  Mean pressure used is 12.8 cm H2O and 95th percentile pressure used is 14.4 cm H2O    Informed that CPAP is effective with current  pressure settings and compliance is good  Patient has high leakage from his mask at certain times during the night and we discussed measures he can take to control the leakage from the CPAP mask  Recommended to continue to use auto CPAP at the current settings  Prescription for CPAP supplies sent  Follow-up 1 year      Orders Placed This Encounter   • SERVICE TO HOME CARE RESPIRATORY THERAPY       Return in about 1 year (around 2/27/2024) for ANNUAL KENTRELL/CPAP DOWNLOAD DATA REVIEW.           unable to assess immunization status...

## 2023-11-12 NOTE — PATIENT PROFILE ADULT - VISION (WITH CORRECTIVE LENSES IF THE PATIENT USUALLY WEARS THEM):
24 hour orders verified Normal vision: sees adequately in most situations; can see medication labels, newsprint

## 2023-11-12 NOTE — PATIENT PROFILE ADULT - FALL HARM RISK - HARM RISK INTERVENTIONS

## 2023-11-12 NOTE — CONSULT NOTE ADULT - ASSESSMENT
48-year-old female with a history of R breast cancer (dx 2017 s/p lumpectomy, radiation tx in 2023, in remission on anastrozole),Type 2 DM presents with headache and right sided ear pain for the past 3 weeks. R pinna is tender to palpation, with associated pain radiating up to the temple and down to her right jaw, no fevers, chills, no ear discharge, bleeding, auditory or neurological changes. CT venogram revealing occlusion of the R internal jugular bulb with intraluminal thrombus extending into the proximal cervical R IJ vein, occlusion of the R sigmoid sinus and nonocclusive thrombus extending into the distal R transverse sinus. Admitted to medicine for further management of thrombus. Ophthalmology consulted for papilledema eval, patient without any visual complaints.    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 [address]  [place] [address]  [phone]  48-year-old female with a history of R breast cancer (dx 2017 s/p lumpectomy, radiation tx in 2023, in remission on anastrozole),Type 2 DM presents with headache and right sided ear pain for the past 3 weeks. R pinna is tender to palpation, with associated pain radiating up to the temple and down to her right jaw, no fevers, chills, no ear discharge, bleeding, auditory or neurological changes. CT venogram revealing occlusion of the R internal jugular bulb with intraluminal thrombus extending into the proximal cervical R IJ vein, occlusion of the R sigmoid sinus and nonocclusive thrombus extending into the distal R transverse sinus. Admitted to medicine for further management of thrombus. Ophthalmology consulted for papilledema eval, patient without any visual complaints.    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 Zhao, neuro-ophthalmologist.     Outpatient follow-up: Patient should follow-up with his/her ophthalmologist or with [facility] upon discharge at the address below     [facility]  [address] [place] [address]. [address]  [place] [address]  [phone]

## 2023-11-12 NOTE — CONSULT NOTE ADULT - SUBJECTIVE AND OBJECTIVE BOX
[facility] DEPARTMENT OF OPHTHALMOLOGY - INITIAL ADULT CONSULT  -----------------------------------------------------------------------------  Wesley Jasso MD, PGY-2  Contact: TEAMS  -----------------------------------------------------------------------------    HPI:  48-year-old female past medical history of R sided breast cancer (dx in 2017, s/p lumpectomy and radiation therapy, on anastrazole 1 mg daily), type 2 DM presents with a worsening headache and right ear pain x3 weeks.  Patient states that for the past 3 weeks has had right-sided headache and ear pain that was intermittent but has worsened over the past 2 days. Notes the pain is worse when lying down and sitting upright. Also reports ear is painful posteriorly and is very tender to palpation, radiating up to the temple and down to her right jaw. She went to urgent care last week and was prescribed eardrops with minimal relief.  Patient denies ear discharge or bleeding, slurred speech, limb weakness, facial droop, auditory or visual changes, fevers, chills, nausea, vomiting, weight changes. Denies any recent travel, prolonged immobilization, or sick contacts.     Denies any history of headaches, stroke, DVTs or catheters in any extremities. No family history of blood clots. Was on aspirin 81 mg daily but discontinued it this year, has never been on AC. Reports she follows at [facility] for her breast cancer care and has been in remission. Gets yearly mammograms, recent one this year was in June 2023 and no recurrence noted. Completed radiation in 2023 and has been on anastrazole 1 mg since April 2023. Also reports she had a pap smear this year 2023 wnl. Has not had a screening colonoscopy yet.    ED vitals: T 98.3 HR 98 /98 RR 18 Sa 100% on RA  Labs: wbc 9.76, Hgb/Hct 12.5/38.3, Plt 202. Coags wnl- PT 10.8 PTT 30.3 INR 0.95, glucose 233  CT internal auditory canal w/ IV contrast and CT venogram brain w/ IV contrast revealing occlusion of the R IJ bulb with intraluminal thrombus extending into the proximal cervical R internal jugular vein, occluding R sigmoid sinus and nonocclusive thrombus extending into the distal R transverse sinus.    In ED, received 1L NS, toradol, reglan. Started on heparin gtt (11 Nov 2023 21:55)    Interval History:  Ophthalmology consulted for papilledema rule out in setting of CT findings and headache as above.     PMH: see HPI   POcHx: denies surg/laser  FH: denies glc/amd  Social History: denies etoh/tobacco  Ophthalmic Medications: none  Allergies: NKDA    Review of Systems:  Constitutional: No fever, chills  Eyes: No blurry vision, flashes, floaters, FBS, erythema, discharge, double vision, OU  Neuro: see HPI   Cardiovascular: No chest pain, palpitations  Respiratory: No SOB, no cough  GI: No nausea, vomiting, abdominal pain  : No dysuria  Skin: no rash  Psych: no depression  Endocrine: no polyuria, polydipsia  Heme/lymph: no swelling    VITALS: T(C): 36.4 (11-12-23 @ 09:31)  T(F): 97.6 (11-12-23 @ 09:31), Max: 98.3 (11-11-23 @ 14:45)  HR: 70 (11-12-23 @ 09:31) (67 - 98)  BP: 115/61 (11-12-23 @ 09:31) (100/61 - 158/98)  RR:  (18 - 18)  SpO2:  (98% - 100%)  Wt(kg): --  General: AAO x 3, appropriate mood and affect    Ophthalmology Exam:  Visual acuity (cc): 20/25 OD, 20/25 OS   Pupils: PERRL OU, no APD  Ttono: 18 OD, 18 OS  Extraocular movements (EOMs): Full OD, full OS, no pain, no diplopia  Confrontational Visual Field (CVF): Full OD, full OS  Color Plates: 12/12 OD, 12/12 OS    Pen Light Exam (PLE)  External: Flat OU  Lids/Lashes/Lacrimal Ducts: Flat OU    Sclera/Conjunctiva: W+Q OU  Cornea: Cl OU  Anterior Chamber: D+F OU    Iris: Flat OU  Lens: Cl OU    Fundus Exam: dilated with 1% tropicamide and 2.5% phenylephrine  Approval obtained from primary team for dilation  Patient aware that pupils can remained dilated for at least 4-6 hours  Exam performed with 20D lens    Vitreous: wnl OU  Disc, cup/disc: sharp and pink, 0.4 OU  Macula: wnl OU  Vessels: superior perivascular flame shaped hemorrhage OD, normal OS  Periphery: wnl OD, peripheral exudate OS     Labs/Imaging:  *** [facility] DEPARTMENT OF OPHTHALMOLOGY - INITIAL ADULT CONSULT  -----------------------------------------------------------------------------  Wesley Jasso MD, PGY-2  Contact: TEAMS  -----------------------------------------------------------------------------    HPI:  48-year-old female past medical history of R sided breast cancer (dx in 2017, s/p lumpectomy and radiation therapy, on anastrazole 1 mg daily), type 2 DM presents with a worsening headache and right ear pain x3 weeks.  Patient states that for the past 3 weeks has had right-sided headache and ear pain that was intermittent but has worsened over the past 2 days. Notes the pain is worse when lying down and sitting upright. Also reports ear is painful posteriorly and is very tender to palpation, radiating up to the temple and down to her right jaw. She went to urgent care last week and was prescribed eardrops with minimal relief.  Patient denies ear discharge or bleeding, slurred speech, limb weakness, facial droop, auditory or visual changes, fevers, chills, nausea, vomiting, weight changes. Denies any recent travel, prolonged immobilization, or sick contacts.     Denies any history of headaches, stroke, DVTs or catheters in any extremities. No family history of blood clots. Was on aspirin 81 mg daily but discontinued it this year, has never been on AC. Reports she follows at [facility] for her breast cancer care and has been in remission. Gets yearly mammograms, recent one this year was in June 2023 and no recurrence noted. Completed radiation in 2023 and has been on anastrazole 1 mg since April 2023. Also reports she had a pap smear this year 2023 wnl. Has not had a screening colonoscopy yet.    ED vitals: T 98.3 HR 98 /98 RR 18 Sa 100% on RA  Labs: wbc 9.76, Hgb/Hct 12.5/38.3, Plt 202. Coags wnl- PT 10.8 PTT 30.3 INR 0.95, glucose 233  CT internal auditory canal w/ IV contrast and CT venogram brain w/ IV contrast revealing occlusion of the R IJ bulb with intraluminal thrombus extending into the proximal cervical R internal jugular vein, occluding R sigmoid sinus and nonocclusive thrombus extending into the distal R transverse sinus.    In ED, received 1L NS, toradol, reglan. Started on heparin gtt (11 Nov 2023 21:55)    Interval History:  Ophthalmology consulted for papilledema rule out in setting of CT findings and headache as above.     PMH: see HPI   POcHx: denies surg/laser  FH: denies glc/amd  Social History: denies etoh/tobacco  Ophthalmic Medications: none  Allergies: NKDA    Review of Systems:  Constitutional: No fever, chills  Eyes: No blurry vision, flashes, floaters, FBS, erythema, discharge, double vision, OU  Neuro: see HPI   Cardiovascular: No chest pain, palpitations  Respiratory: No SOB, no cough  GI: No nausea, vomiting, abdominal pain  : No dysuria  Skin: no rash  Psych: no depression  Endocrine: no polyuria, polydipsia  Heme/lymph: no swelling    VITALS: T(C): 36.4 (11-12-23 @ 09:31)  T(F): 97.6 (11-12-23 @ 09:31), Max: 98.3 (11-11-23 @ 14:45)  HR: 70 (11-12-23 @ 09:31) (67 - 98)  BP: 115/61 (11-12-23 @ 09:31) (100/61 - 158/98)  RR:  (18 - 18)  SpO2:  (98% - 100%)  Wt(kg): --  General: AAO x 3, appropriate mood and affect    Ophthalmology Exam:  Visual acuity (cc): 20/25 OD, 20/25 OS   Pupils: PERRL OU, no APD  Ttono: 18 OD, 18 OS  Extraocular movements (EOMs): Full OD, full OS, no pain, no diplopia  Confrontational Visual Field (CVF): Full OD, full OS  Color Plates: 12/12 OD, 12/12 OS    Pen Light Exam (PLE)  External: Flat OU  Lids/Lashes/Lacrimal Ducts: Flat OU    Sclera/Conjunctiva: W+Q OU  Cornea: Cl OU  Anterior Chamber: D+F OU    Iris: Flat OU  Lens: Cl OU    Fundus Exam: dilated with 1% tropicamide and 2.5% phenylephrine  Approval obtained from primary team for dilation  Patient aware that pupils can remained dilated for at least 4-6 hours  Exam performed with 20D lens    Vitreous: wnl OU  Disc, cup/disc: sharp and pink, 0.4 OU  Macula: wnl OU  Vessels: superior perivascular flame shaped hemorrhage OD, normal OS  Periphery: wnl OD, peripheral exudate OS     Labs/Imaging:

## 2023-11-13 DIAGNOSIS — E78.5 HYPERLIPIDEMIA, UNSPECIFIED: ICD-10-CM

## 2023-11-13 DIAGNOSIS — I10 ESSENTIAL (PRIMARY) HYPERTENSION: ICD-10-CM

## 2023-11-13 LAB
APCR PPP: 2.88 RATIO — SIGNIFICANT CHANGE UP
APCR PPP: 2.88 RATIO — SIGNIFICANT CHANGE UP
APTT BLD: 116.6 SEC — HIGH (ref 24.5–35.6)
APTT BLD: 116.6 SEC — HIGH (ref 24.5–35.6)
APTT BLD: 72.9 SEC — HIGH (ref 24.5–35.6)
APTT BLD: 72.9 SEC — HIGH (ref 24.5–35.6)
APTT BLD: 86.4 SEC — HIGH (ref 24.5–35.6)
APTT BLD: 86.4 SEC — HIGH (ref 24.5–35.6)
B2 GLYCOPROT1 AB SER QL: NEGATIVE — SIGNIFICANT CHANGE UP
B2 GLYCOPROT1 AB SER QL: NEGATIVE — SIGNIFICANT CHANGE UP
GLUCOSE BLDC GLUCOMTR-MCNC: 148 MG/DL — HIGH (ref 70–99)
GLUCOSE BLDC GLUCOMTR-MCNC: 148 MG/DL — HIGH (ref 70–99)
GLUCOSE BLDC GLUCOMTR-MCNC: 162 MG/DL — HIGH (ref 70–99)
GLUCOSE BLDC GLUCOMTR-MCNC: 162 MG/DL — HIGH (ref 70–99)
GLUCOSE BLDC GLUCOMTR-MCNC: 203 MG/DL — HIGH (ref 70–99)
GLUCOSE BLDC GLUCOMTR-MCNC: 203 MG/DL — HIGH (ref 70–99)
GLUCOSE BLDC GLUCOMTR-MCNC: 232 MG/DL — HIGH (ref 70–99)
GLUCOSE BLDC GLUCOMTR-MCNC: 232 MG/DL — HIGH (ref 70–99)
HCT VFR BLD CALC: 35 % — SIGNIFICANT CHANGE UP (ref 34.5–45)
HCT VFR BLD CALC: 35 % — SIGNIFICANT CHANGE UP (ref 34.5–45)
HGB BLD-MCNC: 11.5 G/DL — SIGNIFICANT CHANGE UP (ref 11.5–15.5)
HGB BLD-MCNC: 11.5 G/DL — SIGNIFICANT CHANGE UP (ref 11.5–15.5)
MCHC RBC-ENTMCNC: 24.1 PG — LOW (ref 27–34)
MCHC RBC-ENTMCNC: 24.1 PG — LOW (ref 27–34)
MCHC RBC-ENTMCNC: 32.9 GM/DL — SIGNIFICANT CHANGE UP (ref 32–36)
MCHC RBC-ENTMCNC: 32.9 GM/DL — SIGNIFICANT CHANGE UP (ref 32–36)
MCV RBC AUTO: 73.4 FL — LOW (ref 80–100)
MCV RBC AUTO: 73.4 FL — LOW (ref 80–100)
NRBC # BLD: 0 /100 WBCS — SIGNIFICANT CHANGE UP (ref 0–0)
NRBC # BLD: 0 /100 WBCS — SIGNIFICANT CHANGE UP (ref 0–0)
NRBC # FLD: 0 K/UL — SIGNIFICANT CHANGE UP (ref 0–0)
NRBC # FLD: 0 K/UL — SIGNIFICANT CHANGE UP (ref 0–0)
PLATELET # BLD AUTO: 191 K/UL — SIGNIFICANT CHANGE UP (ref 150–400)
PLATELET # BLD AUTO: 191 K/UL — SIGNIFICANT CHANGE UP (ref 150–400)
PROT C ACT/NOR PPP: 108 % — SIGNIFICANT CHANGE UP (ref 74–150)
PROT C ACT/NOR PPP: 108 % — SIGNIFICANT CHANGE UP (ref 74–150)
PROT S FREE PPP-ACNC: 83 % — SIGNIFICANT CHANGE UP (ref 70–130)
PROT S FREE PPP-ACNC: 83 % — SIGNIFICANT CHANGE UP (ref 70–130)
RBC # BLD: 4.77 M/UL — SIGNIFICANT CHANGE UP (ref 3.8–5.2)
RBC # BLD: 4.77 M/UL — SIGNIFICANT CHANGE UP (ref 3.8–5.2)
RBC # FLD: 15.5 % — HIGH (ref 10.3–14.5)
RBC # FLD: 15.5 % — HIGH (ref 10.3–14.5)
WBC # BLD: 6.72 K/UL — SIGNIFICANT CHANGE UP (ref 3.8–10.5)
WBC # BLD: 6.72 K/UL — SIGNIFICANT CHANGE UP (ref 3.8–10.5)
WBC # FLD AUTO: 6.72 K/UL — SIGNIFICANT CHANGE UP (ref 3.8–10.5)
WBC # FLD AUTO: 6.72 K/UL — SIGNIFICANT CHANGE UP (ref 3.8–10.5)

## 2023-11-13 PROCEDURE — 99232 SBSQ HOSP IP/OBS MODERATE 35: CPT

## 2023-11-13 PROCEDURE — 71275 CT ANGIOGRAPHY CHEST: CPT | Mod: 26

## 2023-11-13 PROCEDURE — 99222 1ST HOSP IP/OBS MODERATE 55: CPT

## 2023-11-13 RX ORDER — APIXABAN 2.5 MG/1
1 TABLET, FILM COATED ORAL
Qty: 60 | Refills: 0
Start: 2023-11-13 | End: 2023-12-12

## 2023-11-13 RX ORDER — KETOROLAC TROMETHAMINE 30 MG/ML
30 SYRINGE (ML) INJECTION ONCE
Refills: 0 | Status: DISCONTINUED | OUTPATIENT
Start: 2023-11-13 | End: 2023-11-13

## 2023-11-13 RX ADMIN — HEPARIN SODIUM 700 UNIT(S)/HR: 5000 INJECTION INTRAVENOUS; SUBCUTANEOUS at 14:36

## 2023-11-13 RX ADMIN — INSULIN GLARGINE 5 UNIT(S): 100 INJECTION, SOLUTION SUBCUTANEOUS at 21:23

## 2023-11-13 RX ADMIN — HEPARIN SODIUM 700 UNIT(S)/HR: 5000 INJECTION INTRAVENOUS; SUBCUTANEOUS at 07:34

## 2023-11-13 RX ADMIN — HEPARIN SODIUM 800 UNIT(S)/HR: 5000 INJECTION INTRAVENOUS; SUBCUTANEOUS at 07:12

## 2023-11-13 RX ADMIN — HEPARIN SODIUM 700 UNIT(S)/HR: 5000 INJECTION INTRAVENOUS; SUBCUTANEOUS at 21:21

## 2023-11-13 RX ADMIN — Medication 30 MILLIGRAM(S): at 10:47

## 2023-11-13 RX ADMIN — Medication 2: at 17:37

## 2023-11-13 RX ADMIN — HEPARIN SODIUM 700 UNIT(S)/HR: 5000 INJECTION INTRAVENOUS; SUBCUTANEOUS at 19:33

## 2023-11-13 RX ADMIN — Medication 1: at 08:55

## 2023-11-13 RX ADMIN — Medication 30 MILLIGRAM(S): at 11:47

## 2023-11-13 NOTE — CONSULT NOTE ADULT - ATTENDING COMMENTS
48F DM2 uncontrolled with hyperglycemia HbA1c 9.3%  Inpatient stable glucose with small Lantus dose and correction scale.  Lifestyle improvement. Raise metformin to 1000mg BID on discharge (take with food).  Follow up outpatient with pcp.  Will sign off please call if questions.    Valeria Crystal MD  Division of Endocrinology  Pager: [phone]    If after 6PM or before 9AM, or on weekends/holidays, please call endocrine answering service for assistance ([phone]).  For nonurgent matters email [email or web address] for assistance.

## 2023-11-13 NOTE — CONSULT NOTE ADULT - ASSESSMENT
This is a 49 yo F /w a PMH of breast cancer and DM2 who presents with headaches, found to have extensive Right IJ thrombus. Endocrinology consulted for DM2 and A1c of 9.3%    #DM2  -c/w lantus 5 units daily  -c/w low admelog correction scales before meals and before bedtime  -RD consult  -FSG before meals and before bedtime  -carb consistent diet  -FSG goal 100-180 while inpatient  -hypoglycemia protocol in place if needed    #discharge  -follow up with PCP at [place] (patient preference)  -increase metformin to 1000mg BID  -can consider addition of januva, and if possible can d/c with Janumet 50-1000mg BID instead of metformin 1000mg BID (if covered by insurance)    #HTN  -BP controlled here  -follow up with PCP for urine albumin creatinine ratio and assessment for appropriateness of ACEi/ARB therapy    #HLD  -patient reports she takes a statin at bedtime. Patient should be on a moderate intensity statin on discharge such as atorvastatin 20mg daily. If patient on higher dose already at home (patient doesnt recall dose) she should take that home instead    Case discussed with Dr. Gabriela Kelly MD  Endocrine Fellow  Can be reached via teams. For follow up questions, discharge recommendations, or new consults, please call answering service at [phone] (weekdays); [phone] (nights/weekends) This is a 49 yo F /w a PMH of breast cancer and DM2 who presents with headaches, found to have extensive Right IJ thrombus. Endocrinology consulted for DM2 and A1c of 9.3%    #DM2  -c/w lantus 5 units daily  -c/w low admelog correction scales before meals and before bedtime  -RD consult  -FSG before meals and before bedtime  -carb consistent diet  -FSG goal 100-180 while inpatient  -hypoglycemia protocol in place if needed    #discharge  -follow up with PCP at [place] (patient preference)  -increase metformin to 1000mg BID      #HTN  -BP controlled here  -follow up with PCP for urine albumin creatinine ratio and assessment for appropriateness of ACEi/ARB therapy    #HLD  -patient reports she takes a statin at bedtime. Patient should be on a moderate intensity statin on discharge such as atorvastatin 20mg daily. If patient on higher dose already at home (patient doesnt recall dose) she should take that home instead    Endocrinology will sign off at this time    Case discussed with Dr. Gabriela Kelly MD  Endocrine Fellow  Can be reached via teams. For follow up questions, discharge recommendations, or new consults, please call answering service at [phone] (weekdays); [phone] (nights/weekends)

## 2023-11-13 NOTE — CONSULT NOTE ADULT - SUBJECTIVE AND OBJECTIVE BOX
HPI:  48-year-old female past medical history of R sided breast cancer (dx in 2017, s/p lumpectomy and radiation therapy, on anastrazole 1 mg daily), type 2 DM presents with a worsening headache and right ear pain x3 weeks. Patient states that for the past 3 weeks has had right-sided headache and ear pain that was intermittent but has worsened over the past 2 days. Notes the pain is worse when lying down and sitting upright. Also reports ear is painful posteriorly and is very tender to palpation, radiating up to the temple and down to her right jaw. She went to urgent care last week and was prescribed eardrops with minimal relief.  Patient denies ear discharge or bleeding, slurred speech, limb weakness, facial droop, auditory or visual changes, fevers, chills, nausea, vomiting, weight changes. Denies any recent travel, prolonged immobilization, or sick contacts.     Denies any history of headaches, stroke, DVTs or catheters in any extremities. No family history of blood clots. Was on aspirin 81 mg daily but discontinued it this year, has never been on AC. Reports she follows at [facility] for her breast cancer care and has been in remission. Gets yearly mammograms, recent one this year was in June 2023 and no recurrence noted. Completed radiation in 2023 and has been on anastrazole 1 mg since April 2023. Also reports she had a pap smear this year 2023 wnl. Has not had a screening colonoscopy yet.    ED vitals: T 98.3 HR 98 /98 RR 18 Sa 100% on RA  Labs: wbc 9.76, Hgb/Hct 12.5/38.3, Plt 202. Coags wnl- PT 10.8 PTT 30.3 INR 0.95, glucose 233  CT internal auditory canal w/ IV contrast and CT venogram brain w/ IV contrast revealing occlusion of the R IJ bulb with intraluminal thrombus extending into the proximal cervical R internal jugular vein, occluding R sigmoid sinus and nonocclusive thrombus extending into the distal R transverse sinus.    In ED, received 1L NS, toradol, reglan. Started on heparin gtt (11 Nov 2023 21:55)      Consulted for: uncontrolled DM2  This is a 47 yo F /w a PMH of breast cancer and DM2 who presents with headaches, found to have extensive Right IJ thrombus. Endocrinology consulted for DM2 and A1c of 9.3%    Patient reports being diagnosed with DM2 7 years ago. Had gestational diabetes with all 3 of her children.   Reports she has noticed for the last month her fasting FSG has been 150-170s. No hypoglycemia. Does not check at any other time of the day.  Reports she takes metformin 500mg BID. Often misses evening dose.  Denies any polyuria, polydipsia, n/V, abdominal pain, blurry vision, or tingling numbness in her fingers or toes  Reports she gets all her care at [place] and wants to follow up with PCP  Denies a history of retinopathy, nephropathy, or neuropathy    In the hospital sugars 160-250s. Started on lantus 5 units last night with low admelog correction scales    PAST MEDICAL & SURGICAL HISTORY:  DM (diabetes mellitus)      Breast cancer      S/P lumpectomy, right breast          FAMILY HISTORY:  FH: type 2 diabetes (Grandparent)        Social History: denies all toxic habits    Home Medications:  anastrozole 1 mg oral tablet: 1 tab(s) orally once a day (11 Nov 2023 23:30)  metFORMIN 500 mg oral tablet: 1 tab(s) orally 2 times a day (11 Nov 2023 23:29)      MEDICATIONS  (STANDING):  dextrose 5%. 1000 milliLiter(s) (100 mL/Hr) IV Continuous <Continuous>  dextrose 5%. 1000 milliLiter(s) (50 mL/Hr) IV Continuous <Continuous>  dextrose 50% Injectable 25 Gram(s) IV Push once  dextrose 50% Injectable 25 Gram(s) IV Push once  dextrose 50% Injectable 12.5 Gram(s) IV Push once  glucagon  Injectable 1 milliGRAM(s) IntraMuscular once  heparin  Infusion.  Unit(s)/Hr (10 mL/Hr) IV Continuous <Continuous>  insulin glargine Injectable (LANTUS) 5 Unit(s) SubCutaneous at bedtime  insulin lispro (ADMELOG) corrective regimen sliding scale   SubCutaneous at bedtime  insulin lispro (ADMELOG) corrective regimen sliding scale   SubCutaneous three times a day before meals    MEDICATIONS  (PRN):  acetaminophen     Tablet .. 650 milliGRAM(s) Oral every 6 hours PRN Temp greater or equal to 38C (100.4F), Mild Pain (1 - 3), Moderate Pain (4 - 6)  dextrose Oral Gel 15 Gram(s) Oral once PRN Blood Glucose LESS THAN 70 milliGRAM(s)/deciliter  heparin   Injectable 2000 Unit(s) IV Push every 6 hours PRN For aPTT between 40 - 57  heparin   Injectable 4000 Unit(s) IV Push every 6 hours PRN For aPTT less than 40  senna 2 Tablet(s) Oral at bedtime PRN Constipation  traMADol 50 milliGRAM(s) Oral every 6 hours PRN Severe Pain (7 - 10)      Allergies    No Known Allergies    Intolerances      Review of Systems:  Constitutional: No fever  Eyes: No blurry vision  Neuro: No tremors  HEENT: No pain  Cardiovascular: No chest pain, palpitations  Respiratory: No SOB, no cough  GI: No nausea, vomiting, abdominal pain  : No dysuria  Skin: no rash  Psych: no depression  Endocrine: no polyuria, polydipsia  Hem/lymph: no swelling  Osteoporosis: no fractures    PHYSICAL EXAM:  VITALS: T(C): 36.7 (11-13-23 @ 05:30)  T(F): 98 (11-13-23 @ 05:30), Max: 98.5 (11-12-23 @ 15:05)  HR: 75 (11-13-23 @ 05:30) (75 - 92)  BP: 130/79 (11-13-23 @ 05:30) (100/65 - 130/79)  RR:  (15 - 18)  SpO2:  (99% - 100%)  Wt(kg): --  GENERAL: NAD  EYES: No proptosis, no lid lag, anicteric  THYROID: Normal size, no palpable nodules  RESPIRATORY: Clear to auscultation bilaterally  CARDIOVASCULAR: Regular rate and rhythm  GI: Soft, nontender, non distended  EXT: b/l feet without wounds; 2+ pulses  PSYCH: Alert and oriented x 3, reactive mood    POCT Blood Glucose.: 162 mg/dL (11-13-23 @ 08:49)  POCT Blood Glucose.: 263 mg/dL (11-12-23 @ 21:53)  POCT Blood Glucose.: 172 mg/dL (11-12-23 @ 17:25)  POCT Blood Glucose.: 253 mg/dL (11-12-23 @ 12:33)  POCT Blood Glucose.: 152 mg/dL (11-12-23 @ 09:22)  POCT Blood Glucose.: 209 mg/dL (11-12-23 @ 01:34)  POCT Blood Glucose.: 274 mg/dL (11-11-23 @ 14:48)                            11.5   6.72  )-----------( 191      ( 13 Nov 2023 06:57 )             35.0       11-12    140  |  106  |  9   ----------------------------<  148<H>  4.1   |  23  |  0.61    eGFR: 110    Ca    9.0      11-12  Mg     2.10     11-12  Phos  4.2     11-12    TPro  6.4  /  Alb  3.8  /  TBili  <0.2  /  DBili  x   /  AST  14  /  ALT  14  /  AlkPhos  92  11-12      Thyroid Function Tests:      A1C with Estimated Average Glucose Result: 9.3 % (11-12-23 @ 06:12)      11-12 Chol 229<H> Direct LDL -- LDL calculated 137<H> HDL 68 Trig 122    Radiology:

## 2023-11-14 LAB
ANION GAP SERPL CALC-SCNC: 12 MMOL/L — SIGNIFICANT CHANGE UP (ref 7–14)
ANION GAP SERPL CALC-SCNC: 12 MMOL/L — SIGNIFICANT CHANGE UP (ref 7–14)
APTT BLD: 68.2 SEC — HIGH (ref 24.5–35.6)
APTT BLD: 68.2 SEC — HIGH (ref 24.5–35.6)
BUN SERPL-MCNC: 10 MG/DL — SIGNIFICANT CHANGE UP (ref 7–23)
BUN SERPL-MCNC: 10 MG/DL — SIGNIFICANT CHANGE UP (ref 7–23)
CALCIUM SERPL-MCNC: 9.5 MG/DL — SIGNIFICANT CHANGE UP (ref 8.4–10.5)
CALCIUM SERPL-MCNC: 9.5 MG/DL — SIGNIFICANT CHANGE UP (ref 8.4–10.5)
CARDIOLIPIN AB SER-ACNC: NEGATIVE — SIGNIFICANT CHANGE UP
CARDIOLIPIN AB SER-ACNC: NEGATIVE — SIGNIFICANT CHANGE UP
CHLORIDE SERPL-SCNC: 103 MMOL/L — SIGNIFICANT CHANGE UP (ref 98–107)
CHLORIDE SERPL-SCNC: 103 MMOL/L — SIGNIFICANT CHANGE UP (ref 98–107)
CO2 SERPL-SCNC: 26 MMOL/L — SIGNIFICANT CHANGE UP (ref 22–31)
CO2 SERPL-SCNC: 26 MMOL/L — SIGNIFICANT CHANGE UP (ref 22–31)
CREAT SERPL-MCNC: 0.62 MG/DL — SIGNIFICANT CHANGE UP (ref 0.5–1.3)
CREAT SERPL-MCNC: 0.62 MG/DL — SIGNIFICANT CHANGE UP (ref 0.5–1.3)
EGFR: 110 ML/MIN/1.73M2 — SIGNIFICANT CHANGE UP
EGFR: 110 ML/MIN/1.73M2 — SIGNIFICANT CHANGE UP
GLUCOSE BLDC GLUCOMTR-MCNC: 148 MG/DL — HIGH (ref 70–99)
GLUCOSE BLDC GLUCOMTR-MCNC: 148 MG/DL — HIGH (ref 70–99)
GLUCOSE BLDC GLUCOMTR-MCNC: 211 MG/DL — HIGH (ref 70–99)
GLUCOSE BLDC GLUCOMTR-MCNC: 211 MG/DL — HIGH (ref 70–99)
GLUCOSE BLDC GLUCOMTR-MCNC: 245 MG/DL — HIGH (ref 70–99)
GLUCOSE SERPL-MCNC: 165 MG/DL — HIGH (ref 70–99)
GLUCOSE SERPL-MCNC: 165 MG/DL — HIGH (ref 70–99)
HCT VFR BLD CALC: 35.6 % — SIGNIFICANT CHANGE UP (ref 34.5–45)
HCT VFR BLD CALC: 35.6 % — SIGNIFICANT CHANGE UP (ref 34.5–45)
HGB BLD-MCNC: 11.7 G/DL — SIGNIFICANT CHANGE UP (ref 11.5–15.5)
HGB BLD-MCNC: 11.7 G/DL — SIGNIFICANT CHANGE UP (ref 11.5–15.5)
MAGNESIUM SERPL-MCNC: 2.1 MG/DL — SIGNIFICANT CHANGE UP (ref 1.6–2.6)
MAGNESIUM SERPL-MCNC: 2.1 MG/DL — SIGNIFICANT CHANGE UP (ref 1.6–2.6)
MCHC RBC-ENTMCNC: 23.8 PG — LOW (ref 27–34)
MCHC RBC-ENTMCNC: 23.8 PG — LOW (ref 27–34)
MCHC RBC-ENTMCNC: 32.9 GM/DL — SIGNIFICANT CHANGE UP (ref 32–36)
MCHC RBC-ENTMCNC: 32.9 GM/DL — SIGNIFICANT CHANGE UP (ref 32–36)
MCV RBC AUTO: 72.5 FL — LOW (ref 80–100)
MCV RBC AUTO: 72.5 FL — LOW (ref 80–100)
NRBC # BLD: 0 /100 WBCS — SIGNIFICANT CHANGE UP (ref 0–0)
NRBC # BLD: 0 /100 WBCS — SIGNIFICANT CHANGE UP (ref 0–0)
NRBC # FLD: 0 K/UL — SIGNIFICANT CHANGE UP (ref 0–0)
NRBC # FLD: 0 K/UL — SIGNIFICANT CHANGE UP (ref 0–0)
PHOSPHATE SERPL-MCNC: 4.7 MG/DL — HIGH (ref 2.5–4.5)
PHOSPHATE SERPL-MCNC: 4.7 MG/DL — HIGH (ref 2.5–4.5)
PLATELET # BLD AUTO: 217 K/UL — SIGNIFICANT CHANGE UP (ref 150–400)
PLATELET # BLD AUTO: 217 K/UL — SIGNIFICANT CHANGE UP (ref 150–400)
POTASSIUM SERPL-MCNC: 4.1 MMOL/L — SIGNIFICANT CHANGE UP (ref 3.5–5.3)
POTASSIUM SERPL-MCNC: 4.1 MMOL/L — SIGNIFICANT CHANGE UP (ref 3.5–5.3)
POTASSIUM SERPL-SCNC: 4.1 MMOL/L — SIGNIFICANT CHANGE UP (ref 3.5–5.3)
POTASSIUM SERPL-SCNC: 4.1 MMOL/L — SIGNIFICANT CHANGE UP (ref 3.5–5.3)
RBC # BLD: 4.91 M/UL — SIGNIFICANT CHANGE UP (ref 3.8–5.2)
RBC # BLD: 4.91 M/UL — SIGNIFICANT CHANGE UP (ref 3.8–5.2)
RBC # FLD: 15.4 % — HIGH (ref 10.3–14.5)
RBC # FLD: 15.4 % — HIGH (ref 10.3–14.5)
SODIUM SERPL-SCNC: 141 MMOL/L — SIGNIFICANT CHANGE UP (ref 135–145)
SODIUM SERPL-SCNC: 141 MMOL/L — SIGNIFICANT CHANGE UP (ref 135–145)
WBC # BLD: 6.7 K/UL — SIGNIFICANT CHANGE UP (ref 3.8–10.5)
WBC # BLD: 6.7 K/UL — SIGNIFICANT CHANGE UP (ref 3.8–10.5)
WBC # FLD AUTO: 6.7 K/UL — SIGNIFICANT CHANGE UP (ref 3.8–10.5)
WBC # FLD AUTO: 6.7 K/UL — SIGNIFICANT CHANGE UP (ref 3.8–10.5)

## 2023-11-14 PROCEDURE — 99232 SBSQ HOSP IP/OBS MODERATE 35: CPT

## 2023-11-14 RX ADMIN — Medication 2: at 18:14

## 2023-11-14 RX ADMIN — HEPARIN SODIUM 700 UNIT(S)/HR: 5000 INJECTION INTRAVENOUS; SUBCUTANEOUS at 19:12

## 2023-11-14 RX ADMIN — HEPARIN SODIUM 700 UNIT(S)/HR: 5000 INJECTION INTRAVENOUS; SUBCUTANEOUS at 07:31

## 2023-11-14 RX ADMIN — Medication 2: at 12:41

## 2023-11-14 RX ADMIN — TRAMADOL HYDROCHLORIDE 50 MILLIGRAM(S): 50 TABLET ORAL at 13:18

## 2023-11-14 RX ADMIN — TRAMADOL HYDROCHLORIDE 50 MILLIGRAM(S): 50 TABLET ORAL at 14:18

## 2023-11-14 RX ADMIN — HEPARIN SODIUM 700 UNIT(S)/HR: 5000 INJECTION INTRAVENOUS; SUBCUTANEOUS at 05:08

## 2023-11-14 RX ADMIN — INSULIN GLARGINE 5 UNIT(S): 100 INJECTION, SOLUTION SUBCUTANEOUS at 23:04

## 2023-11-14 NOTE — SBIRT NOTE ADULT - NSSBIRTDRGPOSREINDET_GEN_A_CORE
SW met with pt at bedside.  She denies drug use.  Provided positive reinforcement and reviewed impact of drug use on body and mind's health and daily functioning.  Pt verbalized understanding of information discussed.

## 2023-11-15 VITALS
OXYGEN SATURATION: 100 % | DIASTOLIC BLOOD PRESSURE: 74 MMHG | RESPIRATION RATE: 17 BRPM | HEART RATE: 99 BPM | SYSTOLIC BLOOD PRESSURE: 126 MMHG

## 2023-11-15 LAB
ANION GAP SERPL CALC-SCNC: 11 MMOL/L — SIGNIFICANT CHANGE UP (ref 7–14)
ANION GAP SERPL CALC-SCNC: 11 MMOL/L — SIGNIFICANT CHANGE UP (ref 7–14)
APTT BLD: 78.6 SEC — HIGH (ref 24.5–35.6)
APTT BLD: 78.6 SEC — HIGH (ref 24.5–35.6)
BUN SERPL-MCNC: 12 MG/DL — SIGNIFICANT CHANGE UP (ref 7–23)
BUN SERPL-MCNC: 12 MG/DL — SIGNIFICANT CHANGE UP (ref 7–23)
CALCIUM SERPL-MCNC: 9.6 MG/DL — SIGNIFICANT CHANGE UP (ref 8.4–10.5)
CALCIUM SERPL-MCNC: 9.6 MG/DL — SIGNIFICANT CHANGE UP (ref 8.4–10.5)
CHLORIDE SERPL-SCNC: 102 MMOL/L — SIGNIFICANT CHANGE UP (ref 98–107)
CHLORIDE SERPL-SCNC: 102 MMOL/L — SIGNIFICANT CHANGE UP (ref 98–107)
CO2 SERPL-SCNC: 23 MMOL/L — SIGNIFICANT CHANGE UP (ref 22–31)
CO2 SERPL-SCNC: 23 MMOL/L — SIGNIFICANT CHANGE UP (ref 22–31)
CREAT SERPL-MCNC: 0.62 MG/DL — SIGNIFICANT CHANGE UP (ref 0.5–1.3)
CREAT SERPL-MCNC: 0.62 MG/DL — SIGNIFICANT CHANGE UP (ref 0.5–1.3)
EGFR: 110 ML/MIN/1.73M2 — SIGNIFICANT CHANGE UP
EGFR: 110 ML/MIN/1.73M2 — SIGNIFICANT CHANGE UP
GLUCOSE BLDC GLUCOMTR-MCNC: 171 MG/DL — HIGH (ref 70–99)
GLUCOSE BLDC GLUCOMTR-MCNC: 171 MG/DL — HIGH (ref 70–99)
GLUCOSE BLDC GLUCOMTR-MCNC: 213 MG/DL — HIGH (ref 70–99)
GLUCOSE BLDC GLUCOMTR-MCNC: 213 MG/DL — HIGH (ref 70–99)
GLUCOSE BLDC GLUCOMTR-MCNC: 248 MG/DL — HIGH (ref 70–99)
GLUCOSE BLDC GLUCOMTR-MCNC: 248 MG/DL — HIGH (ref 70–99)
GLUCOSE SERPL-MCNC: 198 MG/DL — HIGH (ref 70–99)
GLUCOSE SERPL-MCNC: 198 MG/DL — HIGH (ref 70–99)
HCT VFR BLD CALC: 38.1 % — SIGNIFICANT CHANGE UP (ref 34.5–45)
HCT VFR BLD CALC: 38.1 % — SIGNIFICANT CHANGE UP (ref 34.5–45)
HGB BLD-MCNC: 12.5 G/DL — SIGNIFICANT CHANGE UP (ref 11.5–15.5)
HGB BLD-MCNC: 12.5 G/DL — SIGNIFICANT CHANGE UP (ref 11.5–15.5)
INR BLD: 0.97 RATIO — SIGNIFICANT CHANGE UP (ref 0.85–1.18)
INR BLD: 0.97 RATIO — SIGNIFICANT CHANGE UP (ref 0.85–1.18)
MAGNESIUM SERPL-MCNC: 2 MG/DL — SIGNIFICANT CHANGE UP (ref 1.6–2.6)
MAGNESIUM SERPL-MCNC: 2 MG/DL — SIGNIFICANT CHANGE UP (ref 1.6–2.6)
MCHC RBC-ENTMCNC: 23.8 PG — LOW (ref 27–34)
MCHC RBC-ENTMCNC: 23.8 PG — LOW (ref 27–34)
MCHC RBC-ENTMCNC: 32.8 GM/DL — SIGNIFICANT CHANGE UP (ref 32–36)
MCHC RBC-ENTMCNC: 32.8 GM/DL — SIGNIFICANT CHANGE UP (ref 32–36)
MCV RBC AUTO: 72.4 FL — LOW (ref 80–100)
MCV RBC AUTO: 72.4 FL — LOW (ref 80–100)
NRBC # BLD: 0 /100 WBCS — SIGNIFICANT CHANGE UP (ref 0–0)
NRBC # BLD: 0 /100 WBCS — SIGNIFICANT CHANGE UP (ref 0–0)
NRBC # FLD: 0 K/UL — SIGNIFICANT CHANGE UP (ref 0–0)
NRBC # FLD: 0 K/UL — SIGNIFICANT CHANGE UP (ref 0–0)
PHOSPHATE SERPL-MCNC: 4.4 MG/DL — SIGNIFICANT CHANGE UP (ref 2.5–4.5)
PHOSPHATE SERPL-MCNC: 4.4 MG/DL — SIGNIFICANT CHANGE UP (ref 2.5–4.5)
PLATELET # BLD AUTO: 216 K/UL — SIGNIFICANT CHANGE UP (ref 150–400)
PLATELET # BLD AUTO: 216 K/UL — SIGNIFICANT CHANGE UP (ref 150–400)
POTASSIUM SERPL-MCNC: 4.4 MMOL/L — SIGNIFICANT CHANGE UP (ref 3.5–5.3)
POTASSIUM SERPL-MCNC: 4.4 MMOL/L — SIGNIFICANT CHANGE UP (ref 3.5–5.3)
POTASSIUM SERPL-SCNC: 4.4 MMOL/L — SIGNIFICANT CHANGE UP (ref 3.5–5.3)
POTASSIUM SERPL-SCNC: 4.4 MMOL/L — SIGNIFICANT CHANGE UP (ref 3.5–5.3)
PROTHROM AB SERPL-ACNC: 10.9 SEC — SIGNIFICANT CHANGE UP (ref 9.5–13)
PROTHROM AB SERPL-ACNC: 10.9 SEC — SIGNIFICANT CHANGE UP (ref 9.5–13)
RBC # BLD: 5.26 M/UL — HIGH (ref 3.8–5.2)
RBC # BLD: 5.26 M/UL — HIGH (ref 3.8–5.2)
RBC # FLD: 15.1 % — HIGH (ref 10.3–14.5)
RBC # FLD: 15.1 % — HIGH (ref 10.3–14.5)
SODIUM SERPL-SCNC: 136 MMOL/L — SIGNIFICANT CHANGE UP (ref 135–145)
SODIUM SERPL-SCNC: 136 MMOL/L — SIGNIFICANT CHANGE UP (ref 135–145)
WBC # BLD: 7.29 K/UL — SIGNIFICANT CHANGE UP (ref 3.8–10.5)
WBC # BLD: 7.29 K/UL — SIGNIFICANT CHANGE UP (ref 3.8–10.5)
WBC # FLD AUTO: 7.29 K/UL — SIGNIFICANT CHANGE UP (ref 3.8–10.5)
WBC # FLD AUTO: 7.29 K/UL — SIGNIFICANT CHANGE UP (ref 3.8–10.5)

## 2023-11-15 PROCEDURE — 70553 MRI BRAIN STEM W/O & W/DYE: CPT | Mod: 26

## 2023-11-15 PROCEDURE — 70545 MR ANGIOGRAPHY HEAD W/DYE: CPT | Mod: 26,59

## 2023-11-15 PROCEDURE — 99232 SBSQ HOSP IP/OBS MODERATE 35: CPT

## 2023-11-15 RX ORDER — SENNA PLUS 8.6 MG/1
2 TABLET ORAL
Qty: 0 | Refills: 0 | DISCHARGE
Start: 2023-11-15

## 2023-11-15 RX ORDER — METFORMIN HYDROCHLORIDE 850 MG/1
1 TABLET ORAL
Refills: 0 | DISCHARGE

## 2023-11-15 RX ORDER — APIXABAN 2.5 MG/1
1 TABLET, FILM COATED ORAL
Qty: 60 | Refills: 0
Start: 2023-11-15 | End: 2023-12-14

## 2023-11-15 RX ORDER — ANASTROZOLE 1 MG/1
1 TABLET ORAL
Refills: 0 | DISCHARGE

## 2023-11-15 RX ORDER — APIXABAN 2.5 MG/1
10 TABLET, FILM COATED ORAL EVERY 12 HOURS
Refills: 0 | Status: DISCONTINUED | OUTPATIENT
Start: 2023-11-15 | End: 2023-11-15

## 2023-11-15 RX ORDER — METFORMIN HYDROCHLORIDE 850 MG/1
2 TABLET ORAL
Qty: 120 | Refills: 0
Start: 2023-11-15 | End: 2023-12-14

## 2023-11-15 RX ORDER — ACETAMINOPHEN 500 MG
2 TABLET ORAL
Qty: 0 | Refills: 0 | DISCHARGE
Start: 2023-11-15

## 2023-11-15 RX ADMIN — Medication 2: at 18:04

## 2023-11-15 RX ADMIN — HEPARIN SODIUM 700 UNIT(S)/HR: 5000 INJECTION INTRAVENOUS; SUBCUTANEOUS at 07:24

## 2023-11-15 RX ADMIN — Medication 1: at 08:44

## 2023-11-15 RX ADMIN — APIXABAN 10 MILLIGRAM(S): 2.5 TABLET, FILM COATED ORAL at 19:01

## 2023-11-15 RX ADMIN — HEPARIN SODIUM 700 UNIT(S)/HR: 5000 INJECTION INTRAVENOUS; SUBCUTANEOUS at 14:53

## 2023-11-15 RX ADMIN — TRAMADOL HYDROCHLORIDE 50 MILLIGRAM(S): 50 TABLET ORAL at 19:01

## 2023-11-15 RX ADMIN — Medication 2: at 12:58

## 2023-11-15 RX ADMIN — HEPARIN SODIUM 700 UNIT(S)/HR: 5000 INJECTION INTRAVENOUS; SUBCUTANEOUS at 06:26

## 2023-11-15 RX ADMIN — TRAMADOL HYDROCHLORIDE 50 MILLIGRAM(S): 50 TABLET ORAL at 13:58

## 2023-11-15 RX ADMIN — TRAMADOL HYDROCHLORIDE 50 MILLIGRAM(S): 50 TABLET ORAL at 12:58

## 2023-11-15 NOTE — DISCHARGE NOTE PROVIDER - NSDCCPTREATMENT_GEN_ALL_CORE_FT
PRINCIPAL PROCEDURE  Procedure: CT head  Findings and Treatment: CT VENOGRAM:   Occlusion of the RIGHT internal jugular bulb with intraluminal thrombus   extending into the proximal cervical RIGHT internal jugular vein.   Occlusion of the RIGHT sigmoid sinus and nonocclusive thrombus extending   into the distal RIGHT transverse sinus.

## 2023-11-15 NOTE — DIETITIAN INITIAL EVALUATION ADULT - PERTINENT LABORATORY DATA
11-15    136  |  102  |  12  ----------------------------<  198<H>  4.4   |  23  |  0.62    Ca    9.6      15 Nov 2023 05:34  Phos  4.4     11-15  Mg     2.00     11-15    POCT Blood Glucose.: 248 mg/dL (11-15-23 @ 12:27)  A1C with Estimated Average Glucose Result: 9.3 % (11-12-23 @ 06:12)

## 2023-11-15 NOTE — CHART NOTE - NSCHARTNOTEFT_GEN_A_CORE
Primary team called regarding  MRI/MRV findings. Discussed that MRI/MRV were elective studies that needn't be obtained inpatient as they would not . MRI brain w/o contrast negative. MRV impression as below:    "Redemonstrated distal right transverse sinus thrombosis with extension to the right internal jugular vein."    Please follow attending attestation recommendations on 11/11. In short, can transition from heparin gtt to DOAC when okay from medical standpoint. Patient is otherwise cleared for discharge from the neurovascular standpoint. She will remain on DOAC for 3 to 6 months at which point repeat venous imaging will be obtained to evaluate the resolution of VST. Patient should speak with oncologist regarding continuing anastrazole in the setting of new VST. Patient should follow up with Stroke NP, Catrina Sanchez or Amena Anderson, in clinic at [address] [place] [address]. Please email [email or web address] w/ basic PHI.     Please call with questions: [phone] Primary team called regarding  MRI/MRV findings. Discussed that MRI/MRV were elective studies that needn't be obtained inpatient as they would not . MRI brain w/o contrast negative. MRV impression as below:    "Redemonstrated distal right transverse sinus thrombosis with extension to the right internal jugular vein."    Please follow attending attestation recommendations on 11/12. In short, can transition from heparin gtt to DOAC when okay from medical standpoint. Patient is otherwise cleared for discharge from the neurovascular standpoint. She will remain on DOAC for 3 to 6 months at which point repeat venous imaging will be obtained to evaluate the resolution of VST. Patient should speak with oncologist regarding continuing anastrazole in the setting of new VST. Patient should follow up with Stroke NP, Catrina Sanchez or Amena Anderson, in clinic at [address] [place] [address]. Please email [email or web address] w/ basic PHI.     Please call with questions: [phone]

## 2023-11-15 NOTE — PROGRESS NOTE ADULT - PROBLEM SELECTOR PLAN 4
Pt with history of R breast cancer dx in 2017 (s/p lumpectomy and radiation completed in 2023, currently on anastrozole 1 mg daily), follows at [facility]. Reports has been in remission since mammogram outpatient in June 2023.    Plan:  Hold anastrozole 1 mg daily for now  CTA chest with IV contrast to evaluate for recurrence/progression of malignancy and to evaluate for PE  Will need to reach out to pt's oncology team at [place] on Monday to see if pt can safely c/w anastrozole given thrombus
Pt with history of R breast cancer dx in 2017 (s/p lumpectomy and radiation completed in 2023, currently on anastrozole 1 mg daily), follows at [facility]. Reports has been in remission since mammogram outpatient in June 2023.    Plan:  Hold anastrozole 1 mg daily for now  CTA chest with IV contrast to evaluate for recurrence/progression of malignancy and to evaluate for PE  Will need to reach out to pt's oncology team at [place] on Monday to see if pt can safely c/w anastrozole given thrombus  - Called Dr. Do's office [phone], left VM
Pt with history of R breast cancer dx in 2017 (s/p lumpectomy and radiation completed in 2023, currently on anastrozole 1 mg daily), follows at [facility]. Reports has been in remission since mammogram outpatient in June 2023.    Plan:  Hold anastrozole 1 mg daily on discharge pending discussion with Oncologist   - CTA chest with IV contrast neg for PE or malignancy   - Called Dr. Maryjane Angeles's office Monday, Tuesday and Wednesday, no response. Per [facility] messaging center- office is open M and Friday. [phone] // [phone]
Pt with history of R breast cancer dx in 2017 (s/p lumpectomy and radiation completed in 2023, currently on anastrozole 1 mg daily), follows at [facility]. Reports has been in remission since mammogram outpatient in June 2023.    Plan:  Hold anastrozole 1 mg daily for now  CTA chest with IV contrast to evaluate for recurrence/progression of malignancy and to evaluate for PE  Will need to reach out to pt's oncology team at [place] on Monday to see if pt can safely c/w anastrozole given thrombus  - Called Dr. Do's office [phone], left VM

## 2023-11-15 NOTE — PROGRESS NOTE ADULT - PROBLEM SELECTOR PLAN 5
Pt with history of Type 2 DM on home metformin 500 mg BID    Plan:  HbA1c elevated at 9.3  Monitor FS QAC TID and QHS  Start on Lantus 5U qHS given hyperglycemia + low-dose ISS QAC TID and QHS  Monitor FS and adjust regimen as needed  [ ] Endo c/s
Pt with history of Type 2 DM on home metformin 500 mg BID    Plan:  HbA1c elevated at 9.3  Monitor FS QAC TID and QHS  Start on Lantus 5U qHS given hyperglycemia + low-dose ISS QAC TID and QHS  Monitor FS and adjust regimen as needed  - D/C on Metformin 1000 BID
Pt with history of Type 2 DM on home metformin 500 mg BID    Plan:  HbA1c elevated at 9.3  Monitor FS QAC TID and QHS  Start on Lantus 5U qHS given hyperglycemia + low-dose ISS QAC TID and QHS  Monitor FS and adjust regimen as needed
Pt with history of Type 2 DM on home metformin 500 mg BID    Plan:  HbA1c elevated at 9.3  Monitor FS QAC TID and QHS  Start on Lantus 5U qHS given hyperglycemia + low-dose ISS QAC TID and QHS  Monitor FS and adjust regimen as needed

## 2023-11-15 NOTE — PHARMACOTHERAPY INTERVENTION NOTE - COMMENTS
Discharge medications reviewed with the patient. Current medication schedule was discussed in detail including: medication name, indication, dose, administration times, treatment duration, side effects, and special instructions. Educated patient on apixaban including the purpose and administration. Discussed adverse effects in detail and when to seek medical attention (blood in stool, vomit, etc.). Advised to avoid NSAIDs to limit risk of bleeding. Also discussed DM management. Patient educated on A1c, healthy plate, and blood glucose monitoring. Plan is to increase metformin to 1000mg BID and follow up with her PCP. Patient encouraged for outpt follow up with medicine and endocrine. Questions and concerns were answered and addressed. Patient demonstrated understanding. Patient provided with educational medication card.    New medications: apixaban    El FentonD, BCPS  Clinical Pharmacy Specialist  [id number]

## 2023-11-15 NOTE — DIETITIAN INITIAL EVALUATION ADULT - DIET TYPE
Halal, lacto-ovo vegetarian/consistent carbohydrate (no snacks)/DASH/TLC (sodium and cholesterol restricted diet)

## 2023-11-15 NOTE — DISCHARGE NOTE PROVIDER - NSDCCPCAREPLAN_GEN_ALL_CORE_FT
PRINCIPAL DISCHARGE DIAGNOSIS  Diagnosis: Cerebral venous sinus thrombosis  Assessment and Plan of Treatment: You had a clot in a large vein in your neck. This caused your headaches. We did an MRI which showed __________. Please take Eliquis as prescribed and follow up with your Oncologist. Please discuss with your Oncologist wether you should re-start your Anastrazole. Please repeat blood work in a few weeks in order to make sure you do not have an underlying blood condition that is causing the clot.  You will need to take Eliquis for AT LEAST the next 3-6 months, please get refills of your perscription from your Oncologist or Primary doctor.      SECONDARY DISCHARGE DIAGNOSES  Diagnosis: DM (diabetes mellitus)  Assessment and Plan of Treatment: Your hemoglobin A1C was 9.3 which is very high. You were seen by the Enocrinologists and recommended that you increase the Metformin to 1000mg BID and follow up with your primary doctor.    Diagnosis: Breast cancer  Assessment and Plan of Treatment: Please STOP taking Anastrazole until you see your Oncologist. There is a VERY SMALL risk that it can cause clots, you should discuss with your Oncologist before you resume taking it.

## 2023-11-15 NOTE — DIETITIAN INITIAL EVALUATION ADULT - OTHER INFO
48-year-old female with a history of R breast cancer (dx 2017 s/p lumpectomy, radiation tx in 2023, in remission on anastrozole),Type 2 DM presents with headache and right sided ear pain for the past 3 weeks. CT venogram revealing occlusion of the R internal jugular bulb with intraluminal thrombus extending into the proximal cervical R IJ vein, occlusion of the R sigmoid sinus and nonocclusive thrombus extending into the distal R transverse sinus, per chart.     Patient reports good appetite. As per RN flow sheet, patient is consuming % of meals. Patient denies any difficulty chewing or swallowing, any nausea, vomiting, diarrhea, constipation during visit. Reports last bowel movement 11/14. Current weight: 53.2kg/117.3lbs (11/11, per chart), consistent with reported usual body weight. No height is documented in chart. Noted elevated HgA1c- 9.3%(11/12), POCT- 148-245mg/dL (last 24hrs), cholesterol-229(11/12). RD provided the patient with extensive verbal and written DM diet education; including, carb counting, label reading, meal planning, pre-prandial and post-prandial finger stick goals, and HbA1c goal. Patient was also made aware of the physiological implications of poor glycemic control. Also discussed Heart Healthy diet recommendations. Importance of having consistent carbohydrate with protein at each meals emphasized. Patient is receptive to information provided.

## 2023-11-15 NOTE — DIETITIAN INITIAL EVALUATION ADULT - ORAL INTAKE PTA/DIET HISTORY
Patient does not know her height, reports usual body weight 115-118lbs. Patient repots good appetite, consumes a halal, lacto-ovo vegetarian diet PTA. Patient has no known food allergies.

## 2023-11-15 NOTE — PROGRESS NOTE ADULT - SUBJECTIVE AND OBJECTIVE BOX
[facility] Division of Hospital Medicine  Sana Amato MD  Available on [employer] TEAMS    SUBJECTIVE / OVERNIGHT EVENTS: Patient seen and examined. Reports that R ear pain is much better now after getting pain medication. She denies any visual issues. She denies and recent dental procedures, recent illness/fevers. Denies family hx of bleeding/clotting disorders.       MEDICATIONS  (STANDING):  dextrose 5%. 1000 milliLiter(s) (50 mL/Hr) IV Continuous <Continuous>  dextrose 5%. 1000 milliLiter(s) (100 mL/Hr) IV Continuous <Continuous>  dextrose 50% Injectable 25 Gram(s) IV Push once  dextrose 50% Injectable 12.5 Gram(s) IV Push once  dextrose 50% Injectable 25 Gram(s) IV Push once  glucagon  Injectable 1 milliGRAM(s) IntraMuscular once  heparin  Infusion.  Unit(s)/Hr (10 mL/Hr) IV Continuous <Continuous>  insulin glargine Injectable (LANTUS) 5 Unit(s) SubCutaneous at bedtime  insulin lispro (ADMELOG) corrective regimen sliding scale   SubCutaneous three times a day before meals  insulin lispro (ADMELOG) corrective regimen sliding scale   SubCutaneous at bedtime    MEDICATIONS  (PRN):  acetaminophen     Tablet .. 650 milliGRAM(s) Oral every 6 hours PRN Temp greater or equal to 38C (100.4F), Mild Pain (1 - 3), Moderate Pain (4 - 6)  dextrose Oral Gel 15 Gram(s) Oral once PRN Blood Glucose LESS THAN 70 milliGRAM(s)/deciliter  heparin   Injectable 4000 Unit(s) IV Push every 6 hours PRN For aPTT less than 40  heparin   Injectable 2000 Unit(s) IV Push every 6 hours PRN For aPTT between 40 - 57  senna 2 Tablet(s) Oral at bedtime PRN Constipation  traMADol 50 milliGRAM(s) Oral every 6 hours PRN Severe Pain (7 - 10)      I&O's Summary      PHYSICAL EXAM:  Vital Signs Last 24 Hrs  T(C): 36.9 (12 Nov 2023 15:05), Max: 36.9 (12 Nov 2023 15:05)  T(F): 98.5 (12 Nov 2023 15:05), Max: 98.5 (12 Nov 2023 15:05)  HR: 76 (12 Nov 2023 15:05) (67 - 76)  BP: 100/65 (12 Nov 2023 15:05) (100/61 - 121/67)  BP(mean): --  RR: 15 (12 Nov 2023 15:05) (15 - 18)  SpO2: 100% (12 Nov 2023 15:05) (99% - 100%)    Parameters below as of 12 Nov 2023 15:05  Patient On (Oxygen Delivery Method): room air      CONSTITUTIONAL: NAD, well-developed, well-groomed  EYES: Conjunctiva and sclera clear  ENMT: Moist oral mucosa, no pharyngeal injection or exudates; normal dentition  RESPIRATORY: Normal respiratory effort; lungs are clear to auscultation bilaterally; No wheezes or rales  CARDIOVASCULAR: Regular rate and rhythm; Normal S1 and S2; No murmurs, rubs, or gallops; No lower extremity edema  ABDOMEN: Soft, Nontender, Nondistended; Bowel sounds present  MUSCULOSKELETAL:  No clubbing or cyanosis of digits; No joint swelling or tenderness to palpation  PSYCH: AAOx3 (oriented to person, place, and time); affect appropriate  NEUROLOGY: No focal deficits  SKIN: No rashes; No palpable lesions    LABS:                        11.7   6.71  )-----------( 188      ( 12 Nov 2023 06:12 )             35.2     11-12    140  |  106  |  9   ----------------------------<  148<H>  4.1   |  23  |  0.61    Ca    9.0      12 Nov 2023 06:12  Phos  4.2     11-12  Mg     2.10     11-12    TPro  6.4  /  Alb  3.8  /  TBili  <0.2  /  DBili  x   /  AST  14  /  ALT  14  /  AlkPhos  92  11-12    PT/INR - ( 12 Nov 2023 06:12 )   PT: 11.1 sec;   INR: 0.99 ratio         PTT - ( 12 Nov 2023 09:45 )  PTT:84.4 sec      Urinalysis Basic - ( 12 Nov 2023 06:12 )    Color: x / Appearance: x / SG: x / pH: x  Gluc: 148 mg/dL / Ketone: x  / Bili: x / Urobili: x   Blood: x / Protein: x / Nitrite: x   Leuk Esterase: x / RBC: x / WBC x   Sq Epi: x / Non Sq Epi: x / Bacteria: x    
Merlin Mathew, MD   Hospitalist  Pager #[phone]    PROGRESS NOTE:     Patient is a 48y old  Female who presents with a chief complaint of Ear pain and headache (13 Nov 2023 16:06)      SUBJECTIVE / OVERNIGHT EVENTS:  Patient reports some stress with family   Has a headahce   No other complaints     ADDITIONAL REVIEW OF SYSTEMS:    MEDICATIONS  (STANDING):  dextrose 5%. 1000 milliLiter(s) (100 mL/Hr) IV Continuous <Continuous>  dextrose 5%. 1000 milliLiter(s) (50 mL/Hr) IV Continuous <Continuous>  dextrose 50% Injectable 25 Gram(s) IV Push once  dextrose 50% Injectable 12.5 Gram(s) IV Push once  dextrose 50% Injectable 25 Gram(s) IV Push once  glucagon  Injectable 1 milliGRAM(s) IntraMuscular once  heparin  Infusion.  Unit(s)/Hr (10 mL/Hr) IV Continuous <Continuous>  insulin glargine Injectable (LANTUS) 5 Unit(s) SubCutaneous at bedtime  insulin lispro (ADMELOG) corrective regimen sliding scale   SubCutaneous at bedtime  insulin lispro (ADMELOG) corrective regimen sliding scale   SubCutaneous three times a day before meals    MEDICATIONS  (PRN):  acetaminophen     Tablet .. 650 milliGRAM(s) Oral every 6 hours PRN Temp greater or equal to 38C (100.4F), Mild Pain (1 - 3), Moderate Pain (4 - 6)  dextrose Oral Gel 15 Gram(s) Oral once PRN Blood Glucose LESS THAN 70 milliGRAM(s)/deciliter  heparin   Injectable 4000 Unit(s) IV Push every 6 hours PRN For aPTT less than 40  heparin   Injectable 2000 Unit(s) IV Push every 6 hours PRN For aPTT between 40 - 57  senna 2 Tablet(s) Oral at bedtime PRN Constipation  traMADol 50 milliGRAM(s) Oral every 6 hours PRN Severe Pain (7 - 10)      CAPILLARY BLOOD GLUCOSE      POCT Blood Glucose.: 171 mg/dL (15 Nov 2023 08:30)  POCT Blood Glucose.: 245 mg/dL (14 Nov 2023 22:39)  POCT Blood Glucose.: 211 mg/dL (14 Nov 2023 17:48)  POCT Blood Glucose.: 245 mg/dL (14 Nov 2023 12:08)    I&O's Summary      PHYSICAL EXAM:  Vital Signs Last 24 Hrs  T(C): 36.8 (15 Nov 2023 06:00), Max: 37 (14 Nov 2023 11:30)  T(F): 98.3 (15 Nov 2023 06:00), Max: 98.6 (14 Nov 2023 11:30)  HR: 75 (15 Nov 2023 06:00) (71 - 88)  BP: 119/64 (15 Nov 2023 06:00) (111/63 - 127/64)  BP(mean): --  RR: 17 (15 Nov 2023 06:00) (16 - 18)  SpO2: 100% (15 Nov 2023 06:00) (99% - 100%)    Parameters below as of 15 Nov 2023 06:00  Patient On (Oxygen Delivery Method): room air        CONSTITUTIONAL: NAD, resting comfortably   RESPIRATORY: Normal respiratory effort; lungs are clear to auscultation bilaterally  CARDIOVASCULAR: Regular rate and rhythm, normal S1 and S2, no murmur/rub/gallop; No lower extremity edema; Peripheral pulses are 2+ bilaterally  ABDOMEN: Nontender to palpation, normoactive bowel sounds, no rebound/guarding; No hepatosplenomegaly  MUSCULOSKELETAL: no clubbing or cyanosis of digits; no joint swelling or tenderness to palpation  PSYCH: A+O to person, place, and time; affect appropriate    LABS:                        12.5   7.29  )-----------( 216      ( 15 Nov 2023 05:34 )             38.1     11-15    136  |  102  |  12  ----------------------------<  198<H>  4.4   |  23  |  0.62    Ca    9.6      15 Nov 2023 05:34  Phos  4.4     11-15  Mg     2.00     11-15      PT/INR - ( 15 Nov 2023 05:34 )   PT: 10.9 sec;   INR: 0.97 ratio         PTT - ( 15 Nov 2023 05:34 )  PTT:78.6 sec      Urinalysis Basic - ( 15 Nov 2023 05:34 )    Color: x / Appearance: x / SG: x / pH: x  Gluc: 198 mg/dL / Ketone: x  / Bili: x / Urobili: x   Blood: x / Protein: x / Nitrite: x   Leuk Esterase: x / RBC: x / WBC x   Sq Epi: x / Non Sq Epi: x / Bacteria: x          RADIOLOGY & ADDITIONAL TESTS:  Results Reviewed:   Imaging Personally Reviewed:  Electrocardiogram Personally Reviewed:    COORDINATION OF CARE:  Care Discussed with Consultants/Other Providers [Y/N]:  Prior or Outpatient Records Reviewed [Y/N]:  
Merlin Mathew, MD   Hospitalist  Pager #[phone]    PROGRESS NOTE:     Patient is a 48y old  Female who presents with a chief complaint of Ear pain and headache (13 Nov 2023 12:33)      SUBJECTIVE / OVERNIGHT EVENTS: NEON   Patient feels fine, has a headache   Denies sensory or motor loss   Denies vision changes       ADDITIONAL REVIEW OF SYSTEMS:    MEDICATIONS  (STANDING):  dextrose 5%. 1000 milliLiter(s) (50 mL/Hr) IV Continuous <Continuous>  dextrose 5%. 1000 milliLiter(s) (100 mL/Hr) IV Continuous <Continuous>  dextrose 50% Injectable 25 Gram(s) IV Push once  dextrose 50% Injectable 12.5 Gram(s) IV Push once  dextrose 50% Injectable 25 Gram(s) IV Push once  glucagon  Injectable 1 milliGRAM(s) IntraMuscular once  heparin  Infusion.  Unit(s)/Hr (10 mL/Hr) IV Continuous <Continuous>  insulin glargine Injectable (LANTUS) 5 Unit(s) SubCutaneous at bedtime  insulin lispro (ADMELOG) corrective regimen sliding scale   SubCutaneous at bedtime  insulin lispro (ADMELOG) corrective regimen sliding scale   SubCutaneous three times a day before meals    MEDICATIONS  (PRN):  acetaminophen     Tablet .. 650 milliGRAM(s) Oral every 6 hours PRN Temp greater or equal to 38C (100.4F), Mild Pain (1 - 3), Moderate Pain (4 - 6)  dextrose Oral Gel 15 Gram(s) Oral once PRN Blood Glucose LESS THAN 70 milliGRAM(s)/deciliter  heparin   Injectable 4000 Unit(s) IV Push every 6 hours PRN For aPTT less than 40  heparin   Injectable 2000 Unit(s) IV Push every 6 hours PRN For aPTT between 40 - 57  senna 2 Tablet(s) Oral at bedtime PRN Constipation  traMADol 50 milliGRAM(s) Oral every 6 hours PRN Severe Pain (7 - 10)      CAPILLARY BLOOD GLUCOSE      POCT Blood Glucose.: 148 mg/dL (13 Nov 2023 12:32)  POCT Blood Glucose.: 162 mg/dL (13 Nov 2023 08:49)  POCT Blood Glucose.: 263 mg/dL (12 Nov 2023 21:53)  POCT Blood Glucose.: 172 mg/dL (12 Nov 2023 17:25)    I&O's Summary      PHYSICAL EXAM:  Vital Signs Last 24 Hrs  T(C): 37.2 (13 Nov 2023 13:30), Max: 37.2 (13 Nov 2023 13:30)  T(F): 98.9 (13 Nov 2023 13:30), Max: 98.9 (13 Nov 2023 13:30)  HR: 82 (13 Nov 2023 13:30) (75 - 92)  BP: 120/73 (13 Nov 2023 13:30) (104/60 - 130/79)  BP(mean): --  RR: 17 (13 Nov 2023 13:30) (16 - 18)  SpO2: 99% (13 Nov 2023 13:30) (99% - 100%)    Parameters below as of 13 Nov 2023 13:30  Patient On (Oxygen Delivery Method): room air        CONSTITUTIONAL: NAD, resting comfortably   RESPIRATORY: Normal respiratory effort; lungs are clear to auscultation bilaterally  CARDIOVASCULAR: Regular rate and rhythm, normal S1 and S2, no murmur/rub/gallop; No lower extremity edema; Peripheral pulses are 2+ bilaterally  ABDOMEN: Nontender to palpation, normoactive bowel sounds, no rebound/guarding; No hepatosplenomegaly  MUSCULOSKELETAL no clubbing or cyanosis of digits; no joint swelling or tenderness to palpation  PSYCH: A+O to person, place, and time; affect appropriate    LABS:                        11.5   6.72  )-----------( 191      ( 13 Nov 2023 06:57 )             35.0     11-12    140  |  106  |  9   ----------------------------<  148<H>  4.1   |  23  |  0.61    Ca    9.0      12 Nov 2023 06:12  Phos  4.2     11-12  Mg     2.10     11-12    TPro  6.4  /  Alb  3.8  /  TBili  <0.2  /  DBili  x   /  AST  14  /  ALT  14  /  AlkPhos  92  11-12    PT/INR - ( 12 Nov 2023 06:12 )   PT: 11.1 sec;   INR: 0.99 ratio         PTT - ( 13 Nov 2023 13:30 )  PTT:86.4 sec      Urinalysis Basic - ( 12 Nov 2023 06:12 )    Color: x / Appearance: x / SG: x / pH: x  Gluc: 148 mg/dL / Ketone: x  / Bili: x / Urobili: x   Blood: x / Protein: x / Nitrite: x   Leuk Esterase: x / RBC: x / WBC x   Sq Epi: x / Non Sq Epi: x / Bacteria: x          RADIOLOGY & ADDITIONAL TESTS:  Results Reviewed:   Imaging Personally Reviewed:  Electrocardiogram Personally Reviewed:    COORDINATION OF CARE:  Care Discussed with Consultants/Other Providers [Y/N]:  Prior or Outpatient Records Reviewed [Y/N]:  
Merlin Mathew, MD   Hospitalist  Pager #[phone]    PROGRESS NOTE:     Patient is a 48y old  Female who presents with a chief complaint of Ear pain and headache (15 Nov 2023 14:44)      SUBJECTIVE / OVERNIGHT EVENTS: NEON   Patient has a headache   No other complaints     ADDITIONAL REVIEW OF SYSTEMS:    MEDICATIONS  (STANDING):  dextrose 5%. 1000 milliLiter(s) (50 mL/Hr) IV Continuous <Continuous>  dextrose 5%. 1000 milliLiter(s) (100 mL/Hr) IV Continuous <Continuous>  dextrose 50% Injectable 25 Gram(s) IV Push once  dextrose 50% Injectable 12.5 Gram(s) IV Push once  dextrose 50% Injectable 25 Gram(s) IV Push once  glucagon  Injectable 1 milliGRAM(s) IntraMuscular once  heparin  Infusion.  Unit(s)/Hr (10 mL/Hr) IV Continuous <Continuous>  insulin glargine Injectable (LANTUS) 5 Unit(s) SubCutaneous at bedtime  insulin lispro (ADMELOG) corrective regimen sliding scale   SubCutaneous three times a day before meals  insulin lispro (ADMELOG) corrective regimen sliding scale   SubCutaneous at bedtime    MEDICATIONS  (PRN):  acetaminophen     Tablet .. 650 milliGRAM(s) Oral every 6 hours PRN Temp greater or equal to 38C (100.4F), Mild Pain (1 - 3), Moderate Pain (4 - 6)  dextrose Oral Gel 15 Gram(s) Oral once PRN Blood Glucose LESS THAN 70 milliGRAM(s)/deciliter  heparin   Injectable 2000 Unit(s) IV Push every 6 hours PRN For aPTT between 40 - 57  heparin   Injectable 4000 Unit(s) IV Push every 6 hours PRN For aPTT less than 40  senna 2 Tablet(s) Oral at bedtime PRN Constipation  traMADol 50 milliGRAM(s) Oral every 6 hours PRN Severe Pain (7 - 10)      CAPILLARY BLOOD GLUCOSE      POCT Blood Glucose.: 248 mg/dL (15 Nov 2023 12:27)  POCT Blood Glucose.: 171 mg/dL (15 Nov 2023 08:30)  POCT Blood Glucose.: 245 mg/dL (14 Nov 2023 22:39)  POCT Blood Glucose.: 211 mg/dL (14 Nov 2023 17:48)    I&O's Summary      PHYSICAL EXAM:  Vital Signs Last 24 Hrs  T(C): 36.9 (15 Nov 2023 14:37), Max: 36.9 (15 Nov 2023 02:30)  T(F): 98.5 (15 Nov 2023 14:37), Max: 98.5 (15 Nov 2023 02:30)  HR: 84 (15 Nov 2023 14:37) (71 - 105)  BP: 114/76 (15 Nov 2023 14:37) (111/63 - 123/77)  BP(mean): --  RR: 17 (15 Nov 2023 14:37) (17 - 18)  SpO2: 99% (15 Nov 2023 14:37) (99% - 100%)    Parameters below as of 15 Nov 2023 14:37  Patient On (Oxygen Delivery Method): room air        CONSTITUTIONAL: NAD, resting comfortably   RESPIRATORY: Normal respiratory effort; lungs are clear to auscultation bilaterally  CARDIOVASCULAR: Regular rate and rhythm, normal S1 and S2, no murmur/rub/gallop; No lower extremity edema; Peripheral pulses are 2+ bilaterally  ABDOMEN: Nontender to palpation, normoactive bowel sounds, no rebound/guarding; No hepatosplenomegaly  MUSCLOSKELETAL: no clubbing or cyanosis of digits; no joint swelling or tenderness to palpation  PSYCH: A+O to person, place, and time; affect appropriate    LABS:                        12.5   7.29  )-----------( 216      ( 15 Nov 2023 05:34 )             38.1     11-15    136  |  102  |  12  ----------------------------<  198<H>  4.4   |  23  |  0.62    Ca    9.6      15 Nov 2023 05:34  Phos  4.4     11-15  Mg     2.00     11-15      PT/INR - ( 15 Nov 2023 05:34 )   PT: 10.9 sec;   INR: 0.97 ratio         PTT - ( 15 Nov 2023 05:34 )  PTT:78.6 sec      Urinalysis Basic - ( 15 Nov 2023 05:34 )    Color: x / Appearance: x / SG: x / pH: x  Gluc: 198 mg/dL / Ketone: x  / Bili: x / Urobili: x   Blood: x / Protein: x / Nitrite: x   Leuk Esterase: x / RBC: x / WBC x   Sq Epi: x / Non Sq Epi: x / Bacteria: x          RADIOLOGY & ADDITIONAL TESTS:  Results Reviewed:   Imaging Personally Reviewed:  Electrocardiogram Personally Reviewed:    COORDINATION OF CARE:  Care Discussed with Consultants/Other Providers [Y/N]:  Prior or Outpatient Records Reviewed [Y/N]:

## 2023-11-15 NOTE — DIETITIAN INITIAL EVALUATION ADULT - PERTINENT MEDS FT
MEDICATIONS  (STANDING):  dextrose 5%. 1000 milliLiter(s) (100 mL/Hr) IV Continuous <Continuous>  dextrose 5%. 1000 milliLiter(s) (50 mL/Hr) IV Continuous <Continuous>  dextrose 50% Injectable 25 Gram(s) IV Push once  dextrose 50% Injectable 25 Gram(s) IV Push once  dextrose 50% Injectable 12.5 Gram(s) IV Push once  glucagon  Injectable 1 milliGRAM(s) IntraMuscular once  insulin glargine Injectable (LANTUS) 5 Unit(s) SubCutaneous at bedtime  insulin lispro (ADMELOG) corrective regimen sliding scale   SubCutaneous at bedtime  insulin lispro (ADMELOG) corrective regimen sliding scale   SubCutaneous three times a day before meals    MEDICATIONS  (PRN):  acetaminophen     Tablet .. 650 milliGRAM(s) Oral every 6 hours PRN Temp greater or equal to 38C (100.4F), Mild Pain (1 - 3), Moderate Pain (4 - 6)  dextrose Oral Gel 15 Gram(s) Oral once PRN Blood Glucose LESS THAN 70 milliGRAM(s)/deciliter  senna 2 Tablet(s) Oral at bedtime PRN Constipation  traMADol 50 milliGRAM(s) Oral every 6 hours PRN Severe Pain (7 - 10)

## 2023-11-15 NOTE — DISCHARGE NOTE PROVIDER - PROVIDER TOKENS
PROVIDER:[TOKEN:[[id number]],FOLLOWUP:[1 week]] PROVIDER:[TOKEN:[[id number]],FOLLOWUP:[1 week]] PROVIDER:[TOKEN:[[id number]],FOLLOWUP:[1 week]] PROVIDER:[TOKEN:[[id number]],FOLLOWUP:[1 week]],PROVIDER:[TOKEN:[[id number]],FOLLOWUP:[Routine]] PROVIDER:[TOKEN:[[id number]],FOLLOWUP:[1 week]],PROVIDER:[TOKEN:[[id number]],FOLLOWUP:[Routine]] PROVIDER:[TOKEN:[[id number]],FOLLOWUP:[1 week]],PROVIDER:[TOKEN:[[id number]],FOLLOWUP:[Routine]]

## 2023-11-15 NOTE — DISCHARGE NOTE PROVIDER - CARE PROVIDER_API CALL
Maryjane Law  Hematology  [address] [place] [phone]  Phone: ()-  Fax: ()-  Follow Up Time: 1 week   Maryjane Law  Hematology  [address] [place] [phone]  Phone: ()-  Fax: ()-  Follow Up Time: 1 week   Maryjane Law  Hematology  [address] [place] [address]  Phone: ()-  Fax: ()-  Follow Up Time: 1 week   Maryjane Law  Hematology  [address] [address]  Phone: ()-  Fax: ()-  Follow Up Time: 1 week    Catrina Sanchez NP in [address], [facility] [address]  [place] [address]  Phone: [phone]  Fax: [phone]  Follow Up Time: Routine   Maryjane Law  Hematology  [address] [address]  Phone: ()-  Fax: ()-  Follow Up Time: 1 week    Catrina Sanchez NP in [address], [facility] [address]  [place] [address]  Phone: [phone]  Fax: [phone]  Follow Up Time: Routine   Maryjane Law  Hematology  [address] [address]  Phone: ()-  Fax: ()-  Follow Up Time: 1 week    Catrina Sanchez NP in [address], [facility] [address]  [place] [address]  Phone: [phone]  Fax: [phone]  Follow Up Time: Routine

## 2023-11-15 NOTE — DISCHARGE NOTE NURSING/CASE MANAGEMENT/SOCIAL WORK - PATIENT PORTAL LINK FT
You can access the FollowMyHealth Patient Portal offered by [facility] by registering at the following website: [email or web address]. By joining [employer]’s FollowMyHealth portal, you will also be able to view your health information using other applications (apps) compatible with our system.

## 2023-11-15 NOTE — PROGRESS NOTE ADULT - PROBLEM SELECTOR PLAN 3
Patient reports R sided eye pain associated with headache, improved since receiving Toradol in ED and starting heparin gtt for R IJ thrombus w extension into the proximal cervical R IJ vein, occlusion of the R sigmoid sinus and distal RIGHT transverse sinus. Reports is nearsighted at baseline but denies acute visual changes or eye pressure.    Plan:  Ophthalmology consult appreciated- No papilledema on fundus exam

## 2023-11-15 NOTE — PROGRESS NOTE ADULT - PROBLEM SELECTOR PLAN 6
DVT ppx: On heparin gtt  Diet: Consistent carb halal diet  Dispo: pending course and clinical improvement
DVT ppx: On heparin gtt  Diet: Consistent carb halal diet  Dispo: Home today pending MRI read     D/w cousin who is a hospitalist in [place]
DVT ppx: On heparin gtt  Diet: Consistent carb halal diet  Dispo: pending course and clinical improvement
DVT ppx: On heparin gtt  Diet: Consistent carb halal diet  Dispo: pending course and clinical improvement

## 2023-11-15 NOTE — DISCHARGE NOTE PROVIDER - NPI NUMBER (FOR SYSADMIN USE ONLY) :
[[id number] [[id number] [[id number]] [[id number]],[[id number]] [[id number]],[[id number]] [[id number]],[[id number]]

## 2023-11-15 NOTE — DISCHARGE NOTE PROVIDER - ATTENDING DISCHARGE PHYSICAL EXAMINATION:
NAD, resting in bed   CV: RRR, no m/r/g/   Resp: CTABL, no w/r/r   Abdomen: Soft, non-tender, non-distended   Ext: No LE edema     Discussed hospital course with patient's cousin who is a hospitalist in [place]. Patient stated understanding that she would need to take AC for next 3-6 months.

## 2023-11-15 NOTE — DISCHARGE NOTE PROVIDER - NSFOLLOWUPCLINICSTOKEN_GEN_ALL_ED_FT
199282:1 week|| ||00\01||False;547735:1 week|| ||00\01||False; 373879:1 week|| ||00\01||False;487523:1 week|| ||00\01||False; [id number]:1 week|| ||00\01||False;904061:1 week|| ||00\01||False;

## 2023-11-15 NOTE — DISCHARGE NOTE PROVIDER - HOSPITAL COURSE
48 y.o F w/ a hx of R sided breast cancer s/p lumpectomy and radiation therapy on Anastrazole who presents with headache and R ear pain for the last three weeks.   Patient reports she follows with Dr. Law at [facility], gets yearly mammograms without recurrence and completed radiation in 2023. Patient underwent CT venogram demonstrating a R IJ bulb thrombus extending into the R IJ vein with occlusion of the R sigmoid sinus and non-occlusive thrombus extending into the distal R transverse sinus.   Patient was seen by Neurology and underwent MRI brain which demonstrated __________. She was managed on a Heparin drip and transitioned to Eliquis. APLS labs were negative but will need to be repeated as an outpatient.   Attempted to reach Dr. Lwa's office a number of times for discussion of hospitalization, no answer.     Patient also noted to have A1C of 9.3, seen by Endocrinology who recommended increasing Metformin to 1000mg BID on discharge.    48 y.o F w/ a hx of R sided breast cancer s/p lumpectomy and radiation therapy on Anastrazole who presents with headache and R ear pain for the last three weeks.   Patient reports she follows with Dr. Law at [facility], gets yearly mammograms without recurrence and completed radiation in 2023. Patient underwent CT venogram demonstrating a R IJ bulb thrombus extending into the R IJ vein with occlusion of the R sigmoid sinus and non-occlusive thrombus extending into the distal R transverse sinus.   Patient was seen by Neurology and underwent MRI brain which demonstrated __________. She was managed on a Heparin drip and transitioned to Eliquis. APLS labs were negative but will need to be repeated as an outpatient.   Attempted to reach Dr. Law's office a number of times for discussion of hospitalization, no answer.     Patient also noted to have A1C of 9.3, seen by Endocrinology who recommended increasing Metformin to 1000mg BID on discharge.    48 y.o F w/ a hx of R sided breast cancer s/p lumpectomy and radiation therapy on Anastrazole who presents with headache and R ear pain for the last three weeks.   Patient reports she follows with Dr. Law at [facility], gets yearly mammograms without recurrence and completed radiation in 2023. Patient underwent CT venogram demonstrating a R IJ bulb thrombus extending into the R IJ vein with occlusion of the R sigmoid sinus and non-occlusive thrombus extending into the distal R transverse sinus.   Patient was seen by Neurology and underwent MRI brain which demonstrated __________. She was managed on a Heparin drip and transitioned to Eliquis. APLS labs were negative but will need to be repeated as an outpatient.   Attempted to reach Dr. Law's office a number of times for discussion of hospitalization, no answer.     Patient also noted to have A1C of 9.3, seen by Endocrinology who recommended increasing Metformin to 1000mg BID on discharge.    48 y.o F w/ a hx of R sided breast cancer s/p lumpectomy and radiation therapy on Anastrazole who presents with headache and R ear pain for the last three weeks.   Patient reports she follows with Dr. Law at [facility], gets yearly mammograms without recurrence and completed radiation in 2023. Patient underwent CT venogram demonstrating a R IJ bulb thrombus extending into the R IJ vein with occlusion of the R sigmoid sinus and non-occlusive thrombus extending into the distal R transverse sinus.   Patient was seen by Neurology and underwent MRI brain which was negative for stroke, MRV demonstrated distal R transverse sinus thrombosis w/ extension into R IJ   She was managed on a Heparin drip and transitioned to Eliquis. APLS labs were negative but will need to be repeated as an outpatient.   Attempted to reach Dr. Law's office a number of times for discussion of hospitalization, no answer.     Patient also noted to have A1C of 9.3, seen by Endocrinology who recommended increasing Metformin to 1000mg BID on discharge.    48 y.o F w/ a hx of R sided breast cancer s/p lumpectomy and radiation therapy on Anastrazole who presents with headache and R ear pain for the last three weeks.   Patient reports she follows with Dr. Law at [facility], gets yearly mammograms without recurrence and completed radiation in 2023. Patient underwent CT venogram demonstrating a R IJ bulb thrombus extending into the R IJ vein with occlusion of the R sigmoid sinus and non-occlusive thrombus extending into the distal R transverse sinus.   Patient was seen by Neurology and underwent MRI brain which was negative for stroke, MRV demonstrated distal R transverse sinus thrombosis w/ extension into R IJ   She was managed on a Heparin drip and transitioned to Eliquis. APLS labs were negative but will need to be repeated as an outpatient.   Attempted to reach Dr. Law's office a number of times for discussion of hospitalization, no answer.     Patient also noted to have A1C of 9.3, seen by Endocrinology who recommended increasing Metformin to 1000mg BID on discharge.    48 y.o F w/ a hx of R sided breast cancer s/p lumpectomy and radiation therapy on Anastrazole who presents with headache and R ear pain for the last three weeks.   Patient reports she follows with Dr. Law at [facility], gets yearly mammograms without recurrence and completed radiation in 2023. Patient underwent CT venogram demonstrating a R IJ bulb thrombus extending into the R IJ vein with occlusion of the R sigmoid sinus and non-occlusive thrombus extending into the distal R transverse sinus.   Patient was seen by Neurology and underwent MRI brain which was negative for stroke, MRV demonstrated distal R transverse sinus thrombosis w/ extension into R IJ   She was managed on a Heparin drip and transitioned to Eliquis. APLS labs were negative but will need to be repeated as an outpatient.   Attempted to reach Dr. Law's office a number of times for discussion of hospitalization, no answer.     Patient also noted to have A1C of 9.3, seen by Endocrinology who recommended increasing Metformin to 1000mg BID on discharge.

## 2023-11-15 NOTE — PROGRESS NOTE ADULT - PROBLEM SELECTOR PROBLEM 1
Thrombosis of internal jugular vein, right

## 2023-11-15 NOTE — DISCHARGE NOTE PROVIDER - NSDCFUADDAPPT_GEN_ALL_CORE_FT
Follow up:     1. Dr. Law regarding your Anastrazole and repeat hypercoaguable workup (lab work)   2. Neurology for follow up of the clot in your neck   3. Neurosurgery for follow up of the clot in your neck   4. Primary doctor : For follow up of your diabetes

## 2023-11-15 NOTE — PROGRESS NOTE ADULT - PROBLEM SELECTOR PLAN 2
As pt with hx of diabetes, concern for malignant otitis externa, but no evidence of infection on CT IAC. No leukocytosis, no fevers or chills, no drainage from the ear. CT venogram and IAC showing R internal jugular bulb with intraluminal thrombus extending into the proximal cervical R internal jugular vein. Occlusion of the R sigmoid sinus and nonocclusive thrombus extending into the distal RIGHT transverse sinus.    Plan:  Monitoring off of abx at this time  Monitor WBC and fever curve  Tylenol 650 mg q6hrs PRN for mild-moderate pain and Tramadol 50 mg q6hrs PRN for severe pain  Continue heparin gtt for thrombus as above

## 2023-11-15 NOTE — PROGRESS NOTE ADULT - ASSESSMENT
48-year-old female with a history of R breast cancer (dx 2017 s/p lumpectomy, radiation tx in 2023, in remission on anastrozole),Type 2 DM presents with headache and right sided ear pain for the past 3 weeks. CT venogram revealing occlusion of the R internal jugular bulb with intraluminal thrombus extending into the proximal cervical R IJ vein, occlusion of the R sigmoid sinus and nonocclusive thrombus extending into the distal R transverse sinus. 

## 2023-11-15 NOTE — DIETITIAN INITIAL EVALUATION ADULT - ADD RECOMMEND
1. Obtain patient's height.   2. Monitor weight, labs, po intake and tolerance, bowel movement, skin integrity.   3. Encourage PO intake and honor food preferences as able.

## 2023-11-15 NOTE — PROGRESS NOTE ADULT - PROBLEM SELECTOR PLAN 1
Pt with history of breast cancer in 2017 s/p lumpectomy and RT, on anastrazole 1 mg daily. CT venogram and IAC showing R internal jugular bulb with intraluminal thrombus extending into the proximal cervical R internal jugular vein. Occlusion of the R sigmoid sinus and nonocclusive thrombus extending into the distal RIGHT transverse sinus. No hx of trauma to the neck, no catheterization of extremities, non smoker, no recent travel. Coag studies wnl. Pt is hypercoagulable and likely provoked due to hx of malignancy and current aromatase inhibitor use, but will do hypercoagulability workup.    Plan:  Continue on heparin gtt  F/u hypercoagulability workup: Inherited thrombophilias (protein S, C, functional assay of activated protein C for factor V Leiden, antithrombin deficiency, beta 2 glycoprotein, anticardiolipin, Lupus anticoagulant) --> will need to be repeated as outpatient  Venous dopplers of b/l lower extremities negative for DVT   [ ] Seen by neuro, can do MRI brain w/o contrast and MRV brain w/ contrast for further eval   Monitor neuro checks q4hrs  Monitor on tele  Ophthalmology eval appreciated, no evidence of papilledema
Pt with history of breast cancer in 2017 s/p lumpectomy and RT, on anastrazole 1 mg daily. CT venogram and IAC showing R internal jugular bulb with intraluminal thrombus extending into the proximal cervical R internal jugular vein. Occlusion of the R sigmoid sinus and nonocclusive thrombus extending into the distal RIGHT transverse sinus. No hx of trauma to the neck, no catheterization of extremities, non smoker, no recent travel. Coag studies wnl. Pt is hypercoagulable and likely provoked due to hx of malignancy and current aromatase inhibitor use, but will do hypercoagulability workup.    Plan:  - Heparin drip, will switch to Eliquis on discharge   - Hypercoagulable workup neg only pending Anti-cardiolipin, will need to be repeated OP.   [ ] MRI brain, venogram done, pending read   Monitor neuro checks q4hrs  Monitor on tele  Ophthalmology eval appreciated, no evidence of papilledema
Pt with history of breast cancer in 2017 s/p lumpectomy and RT, on anastrazole 1 mg daily. CT venogram and IAC showing R internal jugular bulb with intraluminal thrombus extending into the proximal cervical R internal jugular vein. Occlusion of the R sigmoid sinus and nonocclusive thrombus extending into the distal RIGHT transverse sinus. No hx of trauma to the neck, no catheterization of extremities, non smoker, no recent travel. Coag studies wnl. Pt is hypercoagulable and likely provoked due to hx of malignancy and current aromatase inhibitor use, but will do hypercoagulability workup.    Plan:  Continue on heparin gtt  F/u hypercoagulability workup: Inherited thrombophilias (protein S, C, functional assay of activated protein C for factor V Leiden, antithrombin deficiency, beta 2 glycoprotein, anticardiolipin, Lupus anticoagulant) --> will need to be repeated as outpatient  Venous dopplers of b/l lower extremities negative for DVT   Seen by neuro, can do MRI brain w/o contrast and MRV brain w/ contrast for further eval   Monitor neuro checks q4hrs  Monitor on tele  Neurology recs appreciated, as no neuro deficits likely no role for neurosurgery but placed neurosurgery consult, f/u recs for thrombectomy if any changes to neuro exam  Ophthalmology eval appreciated, no evidence of papilledema
Pt with history of breast cancer in 2017 s/p lumpectomy and RT, on anastrazole 1 mg daily. CT venogram and IAC showing R internal jugular bulb with intraluminal thrombus extending into the proximal cervical R internal jugular vein. Occlusion of the R sigmoid sinus and nonocclusive thrombus extending into the distal RIGHT transverse sinus. No hx of trauma to the neck, no catheterization of extremities, non smoker, no recent travel. Coag studies wnl. Pt is hypercoagulable and likely provoked due to hx of malignancy and current aromatase inhibitor use, but will do hypercoagulability workup.    Plan:  Continue on heparin gtt  F/u hypercoagulability workup: Inherited thrombophilias (protein S, C, functional assay of activated protein C for factor V Leiden, antithrombin deficiency, beta 2 glycoprotein, anticardiolipin, Lupus anticoagulant) --> will need to be repeated as outpatient  Venous dopplers of b/l lower extremities negative for DVT   [ ] Seen by neuro, can do MRI brain w/o contrast and MRV brain w/ contrast for further eval   Monitor neuro checks q4hrs  Monitor on tele  [ ] Neurology recs appreciated, as no neuro deficits likely no role for neurosurgery but placed neurosurgery consult, f/u recs for thrombectomy if any changes to neuro exam  Ophthalmology eval appreciated, no evidence of papilledema

## 2023-11-15 NOTE — DISCHARGE NOTE PROVIDER - NSDCMRMEDTOKEN_GEN_ALL_CORE_FT
anastrozole 1 mg oral tablet: 1 tab(s) orally once a day  Eliquis Starter Pack for Treatment of DVT and PE 5 mg oral tablet: 1 tab(s) orally 2 times a day  metFORMIN 500 mg oral tablet: 1 tab(s) orally 2 times a day   acetaminophen 325 mg oral tablet: 2 tab(s) orally every 6 hours As needed Temp greater or equal to 38C (100.4F), Mild Pain (1 - 3), Moderate Pain (4 - 6)  Eliquis Starter Pack for Treatment of DVT and PE 5 mg oral tablet: 1 tab(s) orally 2 times a day  metFORMIN 500 mg oral tablet: 2 tab(s) orally 2 times a day  senna leaf extract oral tablet: 2 tab(s) orally once a day (at bedtime) As needed Constipation

## 2023-11-15 NOTE — DIETITIAN INITIAL EVALUATION ADULT - NS FNS DIET ORDER
Diet, Consistent Carbohydrate w/Evening Snack:   Halal  Lacto-Ovo Veg (Accepts Milk Prod., Eggs) (11-12-23 @ 23:21) [Active]

## 2023-11-15 NOTE — DISCHARGE NOTE PROVIDER - NSFOLLOWUPCLINICS_GEN_ALL_ED_FT
[facility]  Neurology  [address] [place] [address] [address] [place] [address]  Phone: [phone]  Fax:   Follow Up Time: 1 week    [facility] Neurosurgery  Neurosurgery  Referral Assistance Program  [place]   Phone:   Fax:   Follow Up Time: 1 week     [facility]  Neurology  [address] [place] [address] [address]rd [place] [address]  Phone: [phone]  Fax:   Follow Up Time: 1 week    [facility] Neurosurgery  Neurosurgery  Referral Assistance Program  [place]   Phone:   Fax:   Follow Up Time: 1 week     [facility]  Neurology  [address] [place] [address] [address]rd [place] [address]  Phone: [phone]  Fax:   Follow Up Time: 1 week    [facility] Neurosurgery  Neurosurgery  Referral Assistance Program  [place]   Phone:   Fax:   Follow Up Time: 1 week

## 2023-11-15 NOTE — DISCHARGE NOTE NURSING/CASE MANAGEMENT/SOCIAL WORK - NSDCPEFALRISK_GEN_ALL_CORE
For information on Fall & Injury Prevention, visit: [email or web address] OR  [email or web address] OR  https://www.cdc.gov/steadi/patient.html